# Patient Record
Sex: MALE | Race: WHITE
[De-identification: names, ages, dates, MRNs, and addresses within clinical notes are randomized per-mention and may not be internally consistent; named-entity substitution may affect disease eponyms.]

---

## 2017-04-29 ENCOUNTER — HOSPITAL ENCOUNTER (INPATIENT)
Dept: HOSPITAL 11 - JP.ED | Age: 67
LOS: 5 days | Discharge: HOME | DRG: 380 | End: 2017-05-04
Attending: INTERNAL MEDICINE | Admitting: INTERNAL MEDICINE
Payer: MEDICARE

## 2017-04-29 DIAGNOSIS — K21.9: ICD-10-CM

## 2017-04-29 DIAGNOSIS — E78.00: ICD-10-CM

## 2017-04-29 DIAGNOSIS — I10: ICD-10-CM

## 2017-04-29 DIAGNOSIS — K22.70: Primary | ICD-10-CM

## 2017-04-29 DIAGNOSIS — J18.1: ICD-10-CM

## 2017-04-29 DIAGNOSIS — R06.02: ICD-10-CM

## 2017-04-29 PROCEDURE — C9113 INJ PANTOPRAZOLE SODIUM, VIA: HCPCS

## 2017-04-29 PROCEDURE — 36415 COLL VENOUS BLD VENIPUNCTURE: CPT

## 2017-04-29 PROCEDURE — 99284 EMERGENCY DEPT VISIT MOD MDM: CPT

## 2017-04-29 PROCEDURE — 93005 ELECTROCARDIOGRAM TRACING: CPT

## 2017-04-29 PROCEDURE — 96361 HYDRATE IV INFUSION ADD-ON: CPT

## 2017-04-29 PROCEDURE — 84550 ASSAY OF BLOOD/URIC ACID: CPT

## 2017-04-29 PROCEDURE — 86140 C-REACTIVE PROTEIN: CPT

## 2017-04-29 PROCEDURE — 99285 EMERGENCY DEPT VISIT HI MDM: CPT

## 2017-04-29 PROCEDURE — S0077 INJECTION, CLINDAMYCIN PHOSP: HCPCS

## 2017-04-29 PROCEDURE — 93010 ELECTROCARDIOGRAM REPORT: CPT

## 2017-04-29 PROCEDURE — 71020: CPT

## 2017-04-29 PROCEDURE — 80053 COMPREHEN METABOLIC PANEL: CPT

## 2017-04-29 PROCEDURE — 83880 ASSAY OF NATRIURETIC PEPTIDE: CPT

## 2017-04-29 PROCEDURE — 81001 URINALYSIS AUTO W/SCOPE: CPT

## 2017-04-29 PROCEDURE — 85025 COMPLETE CBC W/AUTO DIFF WBC: CPT

## 2017-04-29 RX ADMIN — GUAIFENESIN AND CODEINE PHOSPHATE PRN ML: 10; 100 LIQUID ORAL at 23:55

## 2017-04-29 RX ADMIN — LEVOFLOXACIN SCH MLS/HR: 750 INJECTION, SOLUTION INTRAVENOUS at 17:41

## 2017-04-29 RX ADMIN — GUAIFENESIN AND CODEINE PHOSPHATE PRN ML: 10; 100 LIQUID ORAL at 19:45

## 2017-04-29 RX ADMIN — SUCRALFATE SCH GM: 1 SUSPENSION ORAL at 19:45

## 2017-04-29 NOTE — PCM.HP
H&P History of Present Illness





- General


Date of Service: 04/29/17


Admit Problem/Dx: 


 Admission Diagnosis/Problem





Admission Diagnosis/Problem      Pneumonia








Source of Information: Patient, Family, Provider


History Limitations: Reports: No limitations





- History of Present Illness


Initial Comments - Free Text/Narative: 





CC: I can't stop coughing 





HPI: Willis presents to the emergency room today with nearly 3 weeks of 

progressive cough with increasing sputum production and severe heartburn.  He 

developed both a significant increase in his heartburn symptoms and a cough at 

about the same time.  His cough has become more frequent to the point that he 

is coughing fairly steadily over the past 24 hours.  He has been unable to 

sleep because she's coughing so much.  Cough is productive for yellow sputum on 

a semi-regular basis.  He has some pleuritic pain on the left side of his 

chest.  He has had subjective fevers and chills for more than a week and last 

night had an episode of significant diaphoresis while he was sleeping.  He has 

not measured any temperatures at home.  He also reports severe esophageal 

reflux symptoms with mild to moderate achy pain in his throat.  Pain radiates 

throughout most of his neck.  When his heartburn symptoms flareup that makes 

the pain worse.  Not eating seems to help the pain lessened and baking soda and 

water mixed help the pain some.  He has had similar discomfort but never this 

severe.  He has not had any trouble with vomiting but has not had an appetite 

for the past 2 weeks.  He's had very little to eat or drink other than a she 

reports some very mild periumbilical abdominal pain.  He has had looser stools 

than usual.  No change in bladder habits.  His energy is significantly 

decreased in his been sleeping more than usual.





Workup in the emergency room revealed significant leukocytosis, very elevated C-

reactive protein and probable left lower lobe pneumonia.  Given his significant 

coughing, pneumonia and severe reflux symptoms he will be admitted to the 

hospital for further management.


  ** esophageal


Pain Score (Numeric/FACES): 3





- Related Data


Allergies/Adverse Reactions: 


 Allergies











Allergy/AdvReac Type Severity Reaction Status Date / Time


 


No Known Allergies Allergy   Verified 04/29/17 14:30











Home Medications: 


 Home Meds





Aspirin [Low Dose Aspirin EC] 81 mg PO DAILY 09/02/14 [History]


Omeprazole Magnesium [Prilosec Otc] 40 mg PO DAILY 04/27/16 [History]











Past Medical History


HEENT History: Reports: Cataract, Impaired vision


Cardiovascular History: Reports: High cholesterol, Hypertension, Other (see 

below)


Other Cardiovascular History: "[facility]"


Gastrointestinal History: Reports: Diverticulosis, GERD


Musculoskeletal History: Reports: Other (see below)


Other Musculoskeletal History: chronic right shoulder pain from playing 

softball when younger





- Infectious Disease History


Infectious Disease History: Reports: Chicken pox





- Past Surgical History


HEENT Surgical History: Reports: Cataract surgery, Oral surgery, Other (see 

below)


Other HEENT Surgeries/Procedures: both eyes.  Some [facility] problems - [facility], has an appt in [place] on 12/6/2017


GI Surgical History: Reports: Cholecystectomy


Musculoskeletal Surgical History: Reports: Other (see below)


Other Musculoskeletal Surgeries/Procedures:: r ankle and knee surgery





Social & Family History





- Family History


GI: Reports: Cirrhosis (4 brothers), GERD (mother)





- Tobacco Use


Smoking Status *Q: Never Smoker


Years of Tobacco use: 3


Second Hand Smoke Exposure: No





- Caffeine Use


Caffeine Use: Reports: None





- Alcohol Use


Days Per Week of Alcohol Use: 0





- Recreational Drug Use


Recreational Drug Use: No





H&P Review of Systems





- Review of Systems:


Review Of Systems: See Below


Free Text/Narrative: 





A complete 12 point review of systems was obtained.  Pertinent positives and 

negatives are noted in the history of present illness.  All other systems were 

reviewed and were negative except as noted.





Exam





- Exam


Exam: See Below





- Vital Signs


Vital Signs: 


 Last Vital Signs











Temp  37.9 C   04/29/17 14:35


 


Pulse  69   04/29/17 16:20


 


Resp  18   04/29/17 16:20


 


BP  137/62   04/29/17 16:20


 


Pulse Ox  92 L  04/29/17 16:20











Weight: 87.5 kg





- Exam


Quality Assessment: No: supplemental oxygen, urinary catheter


General: alert, oriented, cooperative.  No: mild distress


HEENT: Conjunctiva clear, Mucosa moist & pink.  No: Scleral icterus


Neck: supple, trachea midline.  No: lymphadenopathy, thyromegaly


Lungs: Normal respiratory effort, Rales (left lung base).  No: Wheezing


Cardiovascular: regular rate, regular rhythm.  No: systolic murmur


Abdomen: normal bowel sounds, soft, tenderness (mild periumbilical and 

epigastric ).  No: distention, mass


Back Exam: normal inspection, full range of motion


Extremities: normal inspection, normal pulses.  No: cyanosis, edema


Peripheral Pulses: 1+: dorsalis pedis (L), dorsalis pedis (R)


Skin: warm, dry, intact.  No: rash


Neuro Extensive - Mental Status: alert, oriented x3, nl response to commands


Neuro Extensive - Motor, Sensory, Reflexes: CN II-XII intact.  No: dysarthria, 

abnormal motor, tremor


Psychiatric: alert, normal affect





- Patient Data


Lab Results last 24 hrs: 


 Laboratory Results - last 24 hr











  04/29/17 04/29/17 04/29/17 Range/Units





  14:59 15:13 15:13 


 


WBC  19.4 H    (4.5-11.0)  K/uL


 


RBC  4.25 L    (4.30-5.90)  M/uL


 


Hgb  14.1  D    (12.0-15.0)  g/dL


 


Hct  39.9 L    (40.0-54.0)  %


 


MCV  94    (80-98)  fL


 


MCH  33 H    (27-31)  pg


 


MCHC  35    (32-36)  %


 


Plt Count  233    (150-400)  K/uL


 


Neut % (Auto)  79 H    (36-66)  %


 


Lymph % (Auto)  12 L    (24-44)  %


 


Mono % (Auto)  8 H    (2-6)  %


 


Eos % (Auto)  1 L    (2-4)  %


 


Baso % (Auto)  1    (0-1)  %


 


Sodium    137 L  (140-148)  mmol/L


 


Potassium    3.6  (3.6-5.2)  mmol/L


 


Chloride    103  (100-108)  mmol/L


 


Carbon Dioxide    27  (21-32)  mmol/L


 


Anion Gap    10.6  (5.0-14.0)  mmol/L


 


BUN    12  (7-18)  mg/dL


 


Creatinine    1.4 H  (0.8-1.3)  mg/dL


 


Est Cr Clr Drug Dosing    52.14  mL/min


 


Estimated GFR (MDRD)    50 L  (>60)  


 


Glucose    99  ()  mg/dL


 


Uric Acid    6.3  (3.5-7.2)  mg/dL


 


Calcium    7.8 L  (8.5-10.1)  mg/dL


 


Total Bilirubin    1.0  (0.2-1.0)  mg/dL


 


AST    36  (15-37)  U/L


 


ALT    63  (12-78)  U/L


 


Alkaline Phosphatase    101  ()  U/L


 


C-Reactive Protein   9.10 H   (0.0-0.3)  mg/dL


 


Pro-B-Natriuretic Pept    1056 H  (5-125)  pg/mL


 


Total Protein    6.2 L  (6.4-8.2)  g/dL


 


Albumin    2.6 L  (3.4-5.0)  g/dL


 


Globulin    3.6 H  (2.3-3.5)  g/dL


 


Albumin/Globulin Ratio    0.7 L  (1.2-2.2)  


 


Urine Color     


 


Urine Appearance     


 


Urine pH     (4.5-8.0)  


 


Ur Specific Gravity     (1.008-1.030)  


 


Urine Protein     (NEGATIVE)  mg/dL


 


Urine Glucose (UA)     (NEGATIVE)  mg/dL


 


Urine Ketones     (NEGATIVE)  mg/dL


 


Urine Occult Blood     (NEGATIVE)  


 


Urine Nitrite     (NEGAITVE)  


 


Urine Bilirubin     (NEGATIVE)  


 


Urine Urobilinogen     (NORMAL)  mg/dL


 


Ur Leukocyte Esterase     (NEGATIVE)  


 


Urine RBC     (0-5)  


 


Urine WBC     (0-5)  


 


Ur Epithelial Cells     


 


Amorphous Sediment     


 


Urine Bacteria     


 


Urine Mucus     














  04/29/17 Range/Units





  15:46 


 


WBC   (4.5-11.0)  K/uL


 


RBC   (4.30-5.90)  M/uL


 


Hgb   (12.0-15.0)  g/dL


 


Hct   (40.0-54.0)  %


 


MCV   (80-98)  fL


 


MCH   (27-31)  pg


 


MCHC   (32-36)  %


 


Plt Count   (150-400)  K/uL


 


Neut % (Auto)   (36-66)  %


 


Lymph % (Auto)   (24-44)  %


 


Mono % (Auto)   (2-6)  %


 


Eos % (Auto)   (2-4)  %


 


Baso % (Auto)   (0-1)  %


 


Sodium   (140-148)  mmol/L


 


Potassium   (3.6-5.2)  mmol/L


 


Chloride   (100-108)  mmol/L


 


Carbon Dioxide   (21-32)  mmol/L


 


Anion Gap   (5.0-14.0)  mmol/L


 


BUN   (7-18)  mg/dL


 


Creatinine   (0.8-1.3)  mg/dL


 


Est Cr Clr Drug Dosing   mL/min


 


Estimated GFR (MDRD)   (>60)  


 


Glucose   ()  mg/dL


 


Uric Acid   (3.5-7.2)  mg/dL


 


Calcium   (8.5-10.1)  mg/dL


 


Total Bilirubin   (0.2-1.0)  mg/dL


 


AST   (15-37)  U/L


 


ALT   (12-78)  U/L


 


Alkaline Phosphatase   ()  U/L


 


C-Reactive Protein   (0.0-0.3)  mg/dL


 


Pro-B-Natriuretic Pept   (5-125)  pg/mL


 


Total Protein   (6.4-8.2)  g/dL


 


Albumin   (3.4-5.0)  g/dL


 


Globulin   (2.3-3.5)  g/dL


 


Albumin/Globulin Ratio   (1.2-2.2)  


 


Urine Color  Yellow  


 


Urine Appearance  Clear  


 


Urine pH  7.0  (4.5-8.0)  


 


Ur Specific Gravity  1.010  (1.008-1.030)  


 


Urine Protein  Negative  (NEGATIVE)  mg/dL


 


Urine Glucose (UA)  Normal  (NEGATIVE)  mg/dL


 


Urine Ketones  Negative  (NEGATIVE)  mg/dL


 


Urine Occult Blood  Negative  (NEGATIVE)  


 


Urine Nitrite  Negative  (NEGAITVE)  


 


Urine Bilirubin  Negative  (NEGATIVE)  


 


Urine Urobilinogen  1  (NORMAL)  mg/dL


 


Ur Leukocyte Esterase  Negative  (NEGATIVE)  


 


Urine RBC  0-5  (0-5)  


 


Urine WBC  0-5  (0-5)  


 


Ur Epithelial Cells  Rare  


 


Amorphous Sediment  Not seen  


 


Urine Bacteria  Rare  


 


Urine Mucus  Not seen  











Result Diagrams: 


 04/29/17 14:59





 04/29/17 15:13


Imaging Impressions last 24 hrs: 





CXR - images personally reviewed and compared to previous CXR's - there is a 

possible subtle left lung infiltrate near the left heart border. No mass or 

chf. no bony abnormalities. 





EKG INTERPRETATION


EKG Date: 04/29/17


Rhythm: NSR


Rate (beats/min): 68


Axis: LAD-left axis deviation (LAFB)


P-wave: present


QRS: normal


ST-T: normal


QT: normal


EKG Interpretation Comments: 





Poor R wave progression 





*Q Meaningful Use (ADM)





- VTE *Q


VTE Criteria *Q: 








- VTE Risk Assess *Q


Each Risk Factor Represents 1 Point: Minor Surgery Planned


Total Score 1 Point Risk Factors: 1


Each Risk Factor Represents 2 Points: Age 60 - 74 Years


Total Score 2 Point Risk Factors: 2





- Stroke *Q


Stroke Criteria *Q: 








- AMI *Q


AMI Criteria *Q: 








- Problem List


(1) Pneumonia


SNOMED Code(s): 417069725


   ICD Code: J18.9 - PNEUMONIA, UNSPECIFIED ORGANISM   Status: Acute   Current 

Visit: Yes   


Qualifiers: 


   Pneumonia type: due to unspecified organism   Laterality: left   Lung 

location: lower lobe of lung   Qualified Code(s): J18.1 - Lobar pneumonia, 

unspecified organism   





(2) GERD (gastroesophageal reflux disease)


SNOMED Code(s): 820912445


   ICD Code: K21.9 - GASTRO-ESOPHAGEAL REFLUX DISEASE WITHOUT ESOPHAGITIS   

Status: Acute   Current Visit: Yes   


Qualifiers: 


   Esophagitis presence: esophagitis presence not specified   Qualified Code(s)

: K21.9 - Gastro-esophageal reflux disease without esophagitis   


Problem List Initiated/Reviewed/Updated: Yes


Orders Last 24hrs: 


 Active Orders 24 hr











 Category Date Time Status


 


 Patient Status Manage Transfer [TRANSFER] Routine ADT  04/29/17 17:23 Ordered


 


 EKG Documentation Completion [RC] ASDIRECTED Care  04/29/17 16:29 Active


 


 RT Aerosol Therapy [RC] ASDIRECTED Care  04/29/17 15:41 Active


 


 Chest 2V [CR] Stat Exams  04/29/17 15:32 Taken


 


 CULTURE RESPIRATORY + SMEAR [RM] Routine Lab  04/29/17 17:23 Uncollected


 


 Benzonatate [Tessalon Perles] Med  04/29/17 17:22 Active





 100 mg PO TID PRN   


 


 Levofloxacin/Dextrose 5%-Water [Levaquin in D5W 750 MG/ Med  04/29/17 17:30 

Active





 150 ML] 750 mg   





 Premix Bag 1 bag   





 IV Q24H   


 


 Sodium Chloride 0.9% [Normal Saline] 1,000 ml Med  04/29/17 15:45 Active





 IV ASDIRECTED   


 


 Sodium Chloride 0.9% [Normal Saline] 1,000 ml Med  04/29/17 17:30 Active





 IV ASDIRECTED   


 


 Resuscitation Status Routine Resus Stat  04/29/17 17:24 Ordered


 


 EKG 12 Lead [EK] Routine Ther  04/29/17 16:29 Ordered








 Medication Orders





Benzonatate (Tessalon Perles)  100 mg PO TID PRN


   PRN Reason: Cough


Sodium Chloride (Normal Saline)  1,000 mls @ 999 mls/hr IV ASDIRECTED CITLALY


   Last Admin: 04/29/17 15:58  Dose: 999 mls/hr


Levofloxacin/Dextrose 750 mg/ (Premix)  150 mls @ 100 mls/hr IV Q24H CITLALY


Sodium Chloride (Normal Saline)  1,000 mls @ 125 mls/hr IV ASDIRECTED CITLALY








Assessment/Plan Comment:: 





Assessment and plan - 





Left lower lobe pneumonia - chest x-ray did not all that impressive but 

significant crackles on the side with examination and significant cough as 

well.  He has an elevated white count and C-reactive protein.  No significant 

smoking history.  He is not currently hypoxic but has very impressive coughing 

symptoms.  No evidence for sepsis.


-Levofloxacin


-Sputum culture


-Oxygen if needed


-Nebulizers as needed


-Cough suppressant


-Blood cultures if he spikes a fever





Severe GERD - history of reflux but symptoms are usually able to be controlled 

with omeprazole.  Severe symptoms for the past 2-3 weeks.  Unable to keep much 

of anything because of his symptoms.  Unclear if the pneumonia is worsening his 

baseline symptoms or if the GERD led to aspiration.  


-Proton pump inhibitor


-Sucralfate


-Maalox as needed 


-EGD in the morning with Dr. Cantu





Maintenance issues - 


- DVT prophylaxis - SCDs and ambulation


- GI prophylaxis - PPI


- Nutrition - regular diet today, n.p.o. after midnight


- Blanchard catheter - not indicated 





CODE STATUS - full code 





Admission justification - This patient will be admitted for inpatient services 

and is medically appropriate meeting medical necessity for inpatient admission 

as outlined in my documentation.  I reasonably expect the patient will require 

inpatient services that span a period time of 2 midnights or more. I reasonably 

expect this patient to be discharged or transferred within 96 hours [facility]





Disposition - anticipate discharge home after the hospital stay 





Primary care physician - Dr. Anatoly Deras M.D.

## 2017-04-29 NOTE — EDM.PDOC
ED HPI GENERAL MEDICAL PROBLEM





- General


Chief Complaint: General


Stated Complaint: ILLNESS


Time Seen by Provider: 04/29/17 15:34


Source of Information: Reports: Patient, Family


History Limitations: Reports: No limitations





- History of Present Illness


INITIAL COMMENTS - FREE TEXT/NARRATIVE: 





Pt arrived with a cough and fever  He has some pain when he takes a deep 

breath.  He is coughing some yellow sputum. 


Onset: gradual


Duration: Day(s):, Other (pt has been coughing for 1 week. )


Location: Reports: chest


Associated Symptoms: Reports: cough, diaphoresis, fever/chills, loss of appetite

, malaise, shortness of breath


  ** esophageal


Pain Score (Numeric/FACES): 3





- Related Data


 Allergies











Allergy/AdvReac Type Severity Reaction Status Date / Time


 


No Known Allergies Allergy   Verified 04/29/17 14:30











Home Meds: 


 Home Meds





Aspirin [Low Dose Aspirin EC] 81 mg PO DAILY 09/02/14 [History]


Omeprazole Magnesium [Prilosec Otc] 40 mg PO DAILY PRN 04/27/16 [History]











Past Medical History


HEENT History: Reports: Cataract, Impaired vision


Cardiovascular History: Reports: High cholesterol, Hypertension, Other (see 

below)


Other Cardiovascular History: "[facility]"


Gastrointestinal History: Reports: Diverticulosis, GERD


Musculoskeletal History: Reports: Other (see below)


Other Musculoskeletal History: chronic right shoulder pain from playing 

softball when younger





- Infectious Disease History


Infectious Disease History: Reports: Chicken pox





- Past Surgical History


HEENT Surgical History: Reports: Cataract surgery, Oral surgery, Other (see 

below)


Other HEENT Surgeries/Procedures: both eyes.  Some [facility] problems - [facility], has an appt in [place] on 12/6/2017


GI Surgical History: Reports: Cholecystectomy


Musculoskeletal Surgical History: Reports: Other (see below)


Other Musculoskeletal Surgeries/Procedures:: r ankle and knee surgery





Social & Family History





- Tobacco Use


Smoking Status *Q: Never Smoker


Years of Tobacco use: 3


Second Hand Smoke Exposure: No





- Caffeine Use


Caffeine Use: Reports: None





- Alcohol Use


Days Per Week of Alcohol Use: 0





- Recreational Drug Use


Recreational Drug Use: No





ED ROS GENERAL





- Review of Systems


Review Of Systems: See Below


Constitutional: Reports: fever, chills, malaise, diaphoresis


HEENT: Reports: No symptoms


Respiratory: Reports: Shortness of Breath, Cough, Sputum


Cardiovascular: Reports: No symptoms


Endocrine: Reports: no symptoms


GI/Abdominal: Reports: No symptoms


: Reports: no symptoms


Musculoskeletal: Reports: no symptoms





ED EXAM, GENERAL





- Physical Exam


Exam: See Below


Free Text/Narrative:: 





Pt arrived with a continuous  coughing and raising yellow sputum.  He is 

diaphoretic. He is having  alot of acid reflux. 


Exam Limited By: No limitations


General Appearance: alert, moderate distress


Ears: normal TMs


Nose: normal inspection


Throat/Mouth: Normal inspection


Head: atraumatic


Neck: normal inspection


Respiratory/Chest: decreased breath sounds, wheezing


Cardiovascular: regular rate, rhythm


GI/Abdominal: soft, non tender


 (Male) Exam: Deferred


Rectal (Males) Exam: Deferred


Back Exam: normal inspection


Extremities: normal inspection


Neurological: alert, oriented, normal cognition


Psychiatric: normal affect





Course





- Vital Signs


Last Recorded V/S: 


 Last Vital Signs











Temp  37.9 C   04/29/17 14:35


 


Pulse  57 L  04/29/17 14:35


 


Resp  14   04/29/17 14:35


 


BP  136/73   04/29/17 14:35


 


Pulse Ox  95   04/29/17 14:35














- Orders/Labs/Meds


Orders: 


 Active Orders 24 hr











 Category Date Time Status


 


 RT Aerosol Therapy [RC] ASDIRECTED Care  04/29/17 15:41 Active


 


 Chest 2V [CR] Stat Exams  04/29/17 15:32 Taken


 


 COMPREHENSIVE METABOLIC PN,CMP [CHEM] Urgent Lab  04/29/17 15:13 Received


 


 PRO B-TYPE NATRIUR PEPT,BNPPRO [CHEM] Urgent Lab  04/29/17 15:13 Received


 


 URIC ACID [CHEM] Stat Lab  04/29/17 15:13 Received


 


 Sodium Chloride 0.9% [Normal Saline] 1,000 ml Med  04/29/17 15:45 Active





 IV ASDIRECTED   








 Medication Orders





Sodium Chloride (Normal Saline)  1,000 mls @ 999 mls/hr IV ASDIRECTED CITLALY


   Last Admin: 04/29/17 15:58  Dose: 999 mls/hr








Labs: 


 Laboratory Tests











  04/29/17 04/29/17 04/29/17 Range/Units





  14:59 15:13 15:46 


 


WBC  19.4 H    (4.5-11.0)  K/uL


 


RBC  4.25 L    (4.30-5.90)  M/uL


 


Hgb  14.1  D    (12.0-15.0)  g/dL


 


Hct  39.9 L    (40.0-54.0)  %


 


MCV  94    (80-98)  fL


 


MCH  33 H    (27-31)  pg


 


MCHC  35    (32-36)  %


 


Plt Count  233    (150-400)  K/uL


 


Neut % (Auto)  79 H    (36-66)  %


 


Lymph % (Auto)  12 L    (24-44)  %


 


Mono % (Auto)  8 H    (2-6)  %


 


Eos % (Auto)  1 L    (2-4)  %


 


Baso % (Auto)  1    (0-1)  %


 


C-Reactive Protein   9.10 H   (0.0-0.3)  mg/dL


 


Urine Color    Yellow  


 


Urine Appearance    Clear  


 


Urine pH    7.0  (4.5-8.0)  


 


Ur Specific Gravity    1.010  (1.008-1.030)  


 


Urine Protein    Negative  (NEGATIVE)  mg/dL


 


Urine Glucose (UA)    Normal  (NEGATIVE)  mg/dL


 


Urine Ketones    Negative  (NEGATIVE)  mg/dL


 


Urine Occult Blood    Negative  (NEGATIVE)  


 


Urine Nitrite    Negative  (NEGAITVE)  


 


Urine Bilirubin    Negative  (NEGATIVE)  


 


Urine Urobilinogen    1  (NORMAL)  mg/dL


 


Ur Leukocyte Esterase    Negative  (NEGATIVE)  


 


Urine RBC    0-5  (0-5)  


 


Urine WBC    0-5  (0-5)  


 


Ur Epithelial Cells    Rare  


 


Amorphous Sediment    Not seen  


 


Urine Bacteria    Rare  


 


Urine Mucus    Not seen  











Meds: 


Medications











Generic Name Dose Route Start Last Admin





  Trade Name Freq  PRN Reason Stop Dose Admin


 


Sodium Chloride  1,000 mls @ 999 mls/hr  04/29/17 15:45  04/29/17 15:58





  Normal Saline  IV   999 mls/hr





  ASDIRECTED CITLALY   Administration














Discontinued Medications














Generic Name Dose Route Start Last Admin





  Trade Name Freq  PRN Reason Stop Dose Admin


 


Albuterol  2.5 mg  04/29/17 15:41  04/29/17 15:59





  Proventil Neb Soln  NEB  04/29/17 15:42  2.5 mg





  ONETIME ONE   Administration














- Re-Assessments/Exams


Free Text/Narrative Re-Assessment/Exam: 





04/29/17 16:26


 wbc is markedly elevated  crp was greater than 9.  His chest xray does not 

show a clear heart border. 





Departure





- Departure


Time of Disposition: 16:28


Disposition: Admitted As Inpatient 65


Condition: fair


Clinical Impression: 


 Pneumonia, Dehydration, GERD (gastroesophageal reflux disease)





Forms:  ED Department Discharge


Care Plan Goals: 


 admit to Dr rubio. 





- My Orders


Last 24 Hours: 


My Active Orders





04/29/17 15:13


COMPREHENSIVE METABOLIC PN,CMP [CHEM] Urgent 


PRO B-TYPE NATRIUR PEPT,BNPPRO [CHEM] Urgent 


URIC ACID [CHEM] Stat 





04/29/17 15:32


Chest 2V [CR] Stat 





04/29/17 15:41


RT Aerosol Therapy [RC] ASDIRECTED 





04/29/17 15:45


Sodium Chloride 0.9% [Normal Saline] 1,000 ml IV ASDIRECTED 














- Assessment/Plan


Last 24 Hours: 


My Active Orders





04/29/17 15:13


COMPREHENSIVE METABOLIC PN,CMP [CHEM] Urgent 


PRO B-TYPE NATRIUR PEPT,BNPPRO [CHEM] Urgent 


URIC ACID [CHEM] Stat 





04/29/17 15:32


Chest 2V [CR] Stat 





04/29/17 15:41


RT Aerosol Therapy [RC] ASDIRECTED 





04/29/17 15:45


Sodium Chloride 0.9% [Normal Saline] 1,000 ml IV ASDIRECTED

## 2017-04-30 PROCEDURE — 0DB48ZX EXCISION OF ESOPHAGOGASTRIC JUNCTION, VIA NATURAL OR ARTIFICIAL OPENING ENDOSCOPIC, DIAGNOSTIC: ICD-10-PCS | Performed by: SURGERY

## 2017-04-30 RX ADMIN — GUAIFENESIN AND CODEINE PHOSPHATE PRN ML: 10; 100 LIQUID ORAL at 04:06

## 2017-04-30 RX ADMIN — ALBUTEROL SULFATE PRN MG: 2.5 SOLUTION RESPIRATORY (INHALATION) at 21:47

## 2017-04-30 RX ADMIN — GUAIFENESIN AND CODEINE PHOSPHATE PRN ML: 10; 100 LIQUID ORAL at 08:04

## 2017-04-30 RX ADMIN — SUCRALFATE SCH GM: 1 SUSPENSION ORAL at 13:39

## 2017-04-30 RX ADMIN — LEVOFLOXACIN SCH MLS/HR: 750 INJECTION, SOLUTION INTRAVENOUS at 17:19

## 2017-04-30 RX ADMIN — SUCRALFATE SCH: 1 SUSPENSION ORAL at 13:36

## 2017-04-30 RX ADMIN — SUCRALFATE SCH GM: 1 SUSPENSION ORAL at 17:19

## 2017-04-30 RX ADMIN — GUAIFENESIN AND CODEINE PHOSPHATE PRN ML: 10; 100 LIQUID ORAL at 19:29

## 2017-04-30 RX ADMIN — SUCRALFATE SCH GM: 1 SUSPENSION ORAL at 20:26

## 2017-04-30 NOTE — PCM.PN
- General Info


Date of Service: 04/30/17


Functional Status: Reports: pain controlled, tolerating diet, ambulating





- Review of Systems


General: Reports: Fever


Pulmonary: Reports: shortness of breath, cough


Gastrointestinal: Reports: Other (reflux)


Systems Review Comment:: 





some difficulty with cough overnight that interfered with his sleep but 

otherwise no acute events.  Still having moderate reflux symptoms this morning.

  He also had a fever this morning but no associated symptoms.  Blood pressures 

and heart rate have been stable.  He has not been hypoxic.  Tolerating 

antibiotics well.  No complaints of abdominal pain.  





- Patient Data


Vitals - most recent: 


 Last Vital Signs











Temp  38.6 C H  04/30/17 09:30


 


Pulse  67   04/30/17 07:00


 


Resp  18   04/30/17 07:00


 


BP  151/75 H  04/30/17 07:00


 


Pulse Ox  95   04/30/17 07:00











Weight - most recent: 87.5 kg


I&O - last 24 hours: 


 Intake & Output











 04/29/17 04/30/17 04/30/17





 22:59 06:59 14:59


 


Intake Total 120 2332 


 


Output Total  1300 800


 


Balance 120 1032 -800











Lab Results last 24 hrs: 


 Laboratory Results - last 24 hr











  04/30/17 04/30/17 Range/Units





  05:55 05:55 


 


WBC  14.6 H   (4.5-11.0)  K/uL


 


RBC  4.37   (4.30-5.90)  M/uL


 


Hgb  14.4   (12.0-15.0)  g/dL


 


Hct  41.5   (40.0-54.0)  %


 


MCV  95   (80-98)  fL


 


MCH  33 H   (27-31)  pg


 


MCHC  35   (32-36)  %


 


Plt Count  235   (150-400)  K/uL


 


Sodium   141  (140-148)  mmol/L


 


Potassium   4.8  (3.6-5.2)  mmol/L


 


Chloride   107  (100-108)  mmol/L


 


Carbon Dioxide   29  (21-32)  mmol/L


 


Anion Gap   4.7 L  (5.0-14.0)  mmol/L


 


BUN   10  (7-18)  mg/dL


 


Creatinine   1.3  (0.8-1.3)  mg/dL


 


Est Cr Clr Drug Dosing   56.15  mL/min


 


Estimated GFR (MDRD)   55 L  (>60)  


 


Glucose   101  ()  mg/dL


 


Calcium   8.0 L  (8.5-10.1)  mg/dL











Med Orders - Current: 


 Current Medications





Acetaminophen (Tylenol)  650 mg PO Q4H PRN


   PRN Reason: Pain (Mild 1-3)/fever


   Last Admin: 04/29/17 19:45 Dose:  650 mg


Al Hydroxide/Mg Hydroxide (Mag-Al Plus)  30 ml PO Q4H PRN


   PRN Reason: Heartburn


Albuterol (Proventil Neb Soln)  2.5 mg NEB Q4H PRN


   PRN Reason: Shortness Of Breath/wheezing


Benzonatate (Tessalon Perles)  100 mg PO TID PRN


   PRN Reason: Cough


   Last Admin: 04/29/17 23:54 Dose:  100 mg


Guaifenesin/Codeine Phosphate (Robitussin Ac)  10 ml PO Q4H PRN


   PRN Reason: Cough


   Last Admin: 04/30/17 08:04 Dose:  10 ml


Levofloxacin/Dextrose 750 mg/ (Premix)  150 mls @ 100 mls/hr IV Q24H [facility]


   Last Admin: 04/29/17 17:41 Dose:  100 mls/hr


Sodium Chloride (Normal Saline)  1,000 mls @ 125 mls/hr IV ASDIRECTED [facility]


   Last Admin: 04/30/17 02:48 Dose:  125 mls/hr


Ondansetron HCl (Zofran Odt)  4 mg PO Q6H PRN


   PRN Reason: Nausea able to take PO


Ondansetron HCl (Zofran)  4 mg IV Q6H PRN


   PRN Reason: Nausea/Vomiting


Pantoprazole Sodium (Protonix***)  40 mg PO BIDAC [facility]


Sucralfate (Carafate)  1 gm PO QIDACANDBED [facility]


   Last Admin: 04/29/17 19:45 Dose:  1 gm





Discontinued Medications





Acetaminophen (Tylenol)  650 mg RECTAL NOW ONE


   Stop: 04/30/17 09:25


   Last Admin: 04/30/17 09:36 Dose:  650 mg


Albuterol (Proventil Neb Soln)  2.5 mg NEB ONETIME ONE


   Stop: 04/29/17 15:42


   Last Admin: 04/29/17 15:59 Dose:  2.5 mg


Fentanyl (Sublimaze) Confirm Administered Dose 100 mcg .ROUTE .STK-MED ONE


   Stop: 04/30/17 08:15


Sodium Chloride (Normal Saline)  1,000 mls @ 999 mls/hr IV ASDIRECTED [facility]


   Last Admin: 04/29/17 15:58 Dose:  999 mls/hr


Midazolam HCl (Versed 1 Mg/Ml) Confirm Administered Dose 2 mg .ROUTE .STK-MED 

ONE


   Stop: 04/30/17 08:15


Pantoprazole Sodium (Protonix Iv***)  40 mg IV ONETIME ONE


   Stop: 04/29/17 18:40


   Last Admin: 04/29/17 19:45 Dose:  40 mg


Propofol (Diprivan  20 Ml) Confirm Administered Dose 200 mg .ROUTE .STK-MED ONE


   Stop: 04/30/17 08:15











- Exam


Quality Assessment: No: supplemental oxygen


General: alert, oriented, cooperative, no acute distress


Neck: supple


Lungs: Clear to auscultation, Normal respiratory effort, Decreased breath 

sounds (mild left lung base).  No: Wheezing


Cardiovascular: Regular Rate, Regular Rhythm, No Murmurs


Abdomen: soft, no distension


Extremities: no edema, no cyanosis


Skin: warm, dry


Psy/Mental Status: alert, normal affect





- Problem List & Annotations


(1) Pneumonia


SNOMED Code(s): 881769032


   Code(s): J18.9 - PNEUMONIA, UNSPECIFIED ORGANISM   Status: Acute   Current 

Visit: Yes   


Qualifiers: 


   Pneumonia type: due to unspecified organism   Laterality: left   Lung 

location: lower lobe of lung   Qualified Code(s): J18.1 - Lobar pneumonia, 

unspecified organism   





(2) GERD (gastroesophageal reflux disease)


SNOMED Code(s): 549095950


   Code(s): K21.9 - GASTRO-ESOPHAGEAL REFLUX DISEASE WITHOUT ESOPHAGITIS   

Status: Acute   Current Visit: Yes   


Qualifiers: 


   Esophagitis presence: esophagitis presence not specified   Qualified Code(s)

: K21.9 - Gastro-esophageal reflux disease without esophagitis   





- Problem List Review


Problem List Initiated/Reviewed/Updated: Yes





- My Orders


Last 24 Hours: 


My Active Orders





04/29/17 17:24


Resuscitation Status Routine 





04/29/17 18:39


Patient Status [ADT] Routine 


Intake and Output [RC] QSHIFT 


Notify Provider Consults [RC] ASDIRECTED 


Notify Provider Vital Signs [RC] ASDIRECTED 


Oxygen Therapy [RC] PRN 


RT Aerosol Therapy [RC] ASDIRECTED 


Up ad Bre [RC] ASDIRECTED 


Vital Signs [RC] Q4H 


Consult to Physician [CONS] Routine 


Acetaminophen [Tylenol]   650 mg PO Q4H PRN 


Albuterol [Proventil Neb Soln]   2.5 mg NEB Q4H PRN 


Alum Hydrox/Mag Hydrox/Simeth [Mag-Al Plus]   30 ml PO Q4H PRN 


Codeine/guaiFENesin [Robitussin AC]   10 ml PO Q4H PRN 


Ondansetron [Zofran ODT]   4 mg PO Q6H PRN 


Ondansetron [Zofran]   4 mg IV Q6H PRN 


Sequential Compression Device [OM.PC] Per Unit Routine 





04/29/17 20:00


Sucralfate [Carafate]   1 gm PO QIDACANDBED 





04/29/17 Dinner


Regular Diet [DIET] 





04/30/17 07:30


Pantoprazole [ProTONIX***]   40 mg PO BIDAC 





04/30/17 09:25


Blood Culture x2 Reflex Set [OM.PC] Urgent 





04/30/17 09:30


CULTURE BLOOD [BC] Urgent 





04/30/17 09:40


CULTURE BLOOD [BC] Urgent 





05/01/17 05:00


BASIC METABOLIC PANEL,BMP [CHEM] Timed 


CBC W/O DIFF,HEMOGRAM [HEME] Timed (1) 














- Plan


Plan:: 





Assessment and plan - 





Left lower lobe pneumonia - chest x-ray did not all that impressive but 

significant crackles on the side with examination and significant cough as 

well.  fever this morning but no hypoxia.  Tolerating antibiotics.  Exam seems 

to be improving.


-Levofloxacin


-Sputum culture


-Oxygen if needed


-Nebulizers as needed


-Cough suppressant


-Blood cultures if he spikes a fever





Severe GERD - history of reflux but symptoms are usually able to be controlled 

with omeprazole.  EGD revealed large erosion.  Symptoms have improved since the 

procedure.


-twice daily Proton pump inhibitor (3 months) 


-Sucralfate x2-4 weeks


-Maalox as needed 





Maintenance issues - 


- DVT prophylaxis - SCDs and ambulation


- GI prophylaxis - PPI


- Nutrition - restart diet after the EGD


- Blanchard catheter - not indicated 





Disposition - anticipate discharge home after the hospital stay, possibly 

tomorrow if he is afebrile and symptoms continue to improve





Carl Deras M.D. Dear Nidhi Pascal MD,         Bradley Aragon underwent successful cataract surgery on 3/8/2017 in his right eye. The surgery was performed at [facility].         He tolerated the procedure well.  I will be monitoring the healing over the next 4 weeks and prescribing glasses if necessary in 4-6 weeks.          Thank you for allowing me to participate in the care of this nice patient.  Please don't hesitate to contact me with any questions.      Kind Regards,    Santosh Haider MD  [facility]  [address].

## 2017-04-30 NOTE — PCM.SN
- Free Text/Narrative


Note: 


[facility]; concerns of fever


requests medication. patient denies any pain or shortness of breath


a; pnuemonia with fever


p; order Tylenol 1000mg po every 4hr prn fever or pain, Motrin 800mg po every 8 

hr prn pain or fever


    continue present plan of care

## 2017-05-01 RX ADMIN — SUCRALFATE SCH GM: 1 SUSPENSION ORAL at 19:44

## 2017-05-01 RX ADMIN — SUCRALFATE SCH GM: 1 SUSPENSION ORAL at 07:26

## 2017-05-01 RX ADMIN — SUCRALFATE SCH GM: 1 SUSPENSION ORAL at 11:22

## 2017-05-01 RX ADMIN — LEVOFLOXACIN SCH MLS/HR: 750 INJECTION, SOLUTION INTRAVENOUS at 17:34

## 2017-05-01 RX ADMIN — SUCRALFATE SCH GM: 1 SUSPENSION ORAL at 16:34

## 2017-05-01 RX ADMIN — ALBUTEROL SULFATE PRN MG: 2.5 SOLUTION RESPIRATORY (INHALATION) at 14:41

## 2017-05-01 RX ADMIN — ACETYLCYSTEINE SCH MG: 200 INHALANT RESPIRATORY (INHALATION) at 20:21

## 2017-05-01 NOTE — PCM.PN
- General Info


Date of Service: 05/01/17


Functional Status: Reports: ambulating, urinating





- Review of Systems


General: Reports: Fever, Weakness, Chills


Pulmonary: Reports: shortness of breath, cough.  Denies: pleuritic chest pain, 

sputum, hemoptysis, wheezing


Cardiovascular: Reports: Dyspnea on Exertion.  Denies: Chest Pain, Palpitations

, Orthopnea, PND, Edema, Lightheadedness


Gastrointestinal: Reports: No symptoms


Systems Review Comment:: 





This patient is a 67-year-old gentleman admitted with pneumonia and severe 

esophageal reflux.  Continues to experience temperature elevations, white blood 

cell count is improved.  Energy level is better but he continues to have fairly 

poor appetite, intake of liquids has been adequate.





- Patient Data


Vitals - most recent: 


 Last Vital Signs











Temp  101.0 F H  05/01/17 11:23


 


Pulse  80   05/01/17 11:23


 


Resp  16   05/01/17 11:23


 


BP  176/84 H  05/01/17 11:23


 


Pulse Ox  95   05/01/17 11:23











Weight - most recent: 192 lb 14.472 oz


I&O - last 24 hours: 


 Intake & Output











 04/30/17 05/01/17 05/01/17





 22:59 06:59 14:59


 


Intake Total 2000 1835 240


 


Output Total 600 600 300


 


Balance 1400 1235 -60











Lab Results last 24 hrs: 


 Laboratory Results - last 24 hr











  05/01/17 05/01/17 Range/Units





  05:51 05:51 


 


WBC  12.8 H   (4.5-11.0)  K/uL


 


RBC  4.47   (4.30-5.90)  M/uL


 


Hgb  14.7   (12.0-15.0)  g/dL


 


Hct  42.2   (40.0-54.0)  %


 


MCV  94   (80-98)  fL


 


MCH  33 H   (27-31)  pg


 


MCHC  35   (32-36)  %


 


Plt Count  254   (150-400)  K/uL


 


Sodium   139 L  (140-148)  mmol/L


 


Potassium   3.9  (3.6-5.2)  mmol/L


 


Chloride   105  (100-108)  mmol/L


 


Carbon Dioxide   28  (21-32)  mmol/L


 


Anion Gap   9.9  (5.0-14.0)  mmol/L


 


BUN   8  (7-18)  mg/dL


 


Creatinine   1.1  (0.8-1.3)  mg/dL


 


Est Cr Clr Drug Dosing   66.36  mL/min


 


Estimated GFR (MDRD)   > 60  (>60)  


 


Glucose   95  ()  mg/dL


 


Calcium   7.9 L  (8.5-10.1)  mg/dL











Will Results last 24 hrs: 


 Microbiology











 04/30/17 09:40 Aerobic Blood Culture - Preliminary





 Blood - Arm, Left    NO GROWTH AFTER 1 DAY





 Anaerobic Blood Culture - Preliminary





    NO GROWTH AFTER 1 DAY


 


 04/30/17 09:30 Aerobic Blood Culture - Preliminary





 Blood - Arm, Left    NO GROWTH AFTER 1 DAY





 Anaerobic Blood Culture - Preliminary





    NO GROWTH AFTER 1 DAY











Med Orders - Current: 


 Current Medications





Acetaminophen (Tylenol Extra Strength)  1,000 mg PO Q6H PRN


   PRN Reason: Fever


   Last Admin: 05/01/17 10:50 Dose:  1,000 mg


Acetylcysteine (Mucomyst 20%)  200 mg NEB TIDRT [facility]


Al Hydroxide/Mg Hydroxide (Mag-Al Plus)  30 ml PO Q4H PRN


   PRN Reason: Heartburn


Albuterol (Proventil Neb Soln)  2.5 mg NEB Q4H PRN


   PRN Reason: Shortness Of Breath/wheezing


   Last Admin: 04/30/17 21:47 Dose:  2.5 mg


Benzonatate (Tessalon Perles)  200 mg PO TID PRN


   PRN Reason: Cough


   Last Admin: 05/01/17 11:27 Dose:  200 mg


Guaifenesin/Codeine Phosphate (Robitussin Ac)  10 ml PO Q4H PRN


   PRN Reason: Cough


   Last Admin: 04/30/17 19:29 Dose:  10 ml


Levofloxacin/Dextrose 750 mg/ (Premix)  150 mls @ 100 mls/hr IV Q24H [facility]


   Last Admin: 04/30/17 17:19 Dose:  100 mls/hr


Clindamycin Phosphate 600 mg/ (Sodium Chloride)  54 mls @ 100 mls/hr IV Q6H [facility]


Ibuprofen (Motrin)  800 mg PO Q8H PRN


   PRN Reason: Fever


   Last Admin: 05/01/17 11:22 Dose:  800 mg


Ondansetron HCl (Zofran Odt)  4 mg PO Q6H PRN


   PRN Reason: Nausea able to take PO


Ondansetron HCl (Zofran)  4 mg IV Q6H PRN


   PRN Reason: Nausea/Vomiting


Pantoprazole Sodium (Protonix***)  40 mg PO BIDAC [facility]


   Last Admin: 05/01/17 07:46 Dose:  40 mg


Sucralfate (Carafate)  1 gm PO QIDACANDBED [facility]


   Last Admin: 05/01/17 11:22 Dose:  1 gm





Discontinued Medications





Acetaminophen (Tylenol)  650 mg PO Q4H PRN


   PRN Reason: Pain (Mild 1-3)/fever


   Last Admin: 04/30/17 19:23 Dose:  650 mg


Acetaminophen (Tylenol)  650 mg RECTAL NOW ONE


   Stop: 04/30/17 09:25


   Last Admin: 04/30/17 09:36 Dose:  650 mg


Albuterol (Proventil Neb Soln)  2.5 mg NEB ONETIME ONE


   Stop: 04/29/17 15:42


   Last Admin: 04/29/17 15:59 Dose:  2.5 mg


Benzonatate (Tessalon Perles)  100 mg PO TID PRN


   PRN Reason: Cough


   Last Admin: 04/29/17 23:54 Dose:  100 mg


Fentanyl (Sublimaze) Confirm Administered Dose 100 mcg .ROUTE .STK-MED ONE


   Stop: 04/30/17 08:15


Sodium Chloride (Normal Saline)  1,000 mls @ 999 mls/hr IV ASDIRECTED [facility]


   Last Admin: 04/29/17 15:58 Dose:  999 mls/hr


Sodium Chloride (Normal Saline)  1,000 mls @ 125 mls/hr IV ASDIRECTED [facility]


   Last Admin: 05/01/17 08:51 Dose:  125 mls/hr


Midazolam HCl (Versed 1 Mg/Ml) Confirm Administered Dose 2 mg .ROUTE .STK-MED 

ONE


   Stop: 04/30/17 08:15


Pantoprazole Sodium (Protonix Iv***)  40 mg IV ONETIME ONE


   Stop: 04/29/17 18:40


   Last Admin: 04/29/17 19:45 Dose:  40 mg


Propofol (Diprivan  20 Ml) Confirm Administered Dose 200 mg .ROUTE .STK-MED ONE


   Stop: 04/30/17 08:15











- Exam


Quality Assessment: DVT prophylaxis


General: alert, oriented, cooperative, mild distress


Lungs: Clear to auscultation, Normal respiratory effort, Rales, Rhonchi


Cardiovascular: Regular Rate, Regular Rhythm, No Murmurs


Abdomen: bowel sounds present, soft, no tenderness, no distension


Extremities: no edema


Skin: warm, dry, intact





- Problem List Review


Problem List Initiated/Reviewed/Updated: Yes





- My Orders


Last 24 Hours: 


My Active Orders





05/01/17 13:48


Convert IV to Saline Lock [OM.PC] Routine 





05/01/17 13:50


RT Aerosol Therapy [RC] ASDIRECTED 


RT Acapella [RESPCARE] Routine 





05/01/17 14:00


Clindamycin Phosphate [Cleocin] 600 mg   Sodium Chloride 0.9% [Normal Saline] 

50 ml IV Q6H 





05/01/17 21:00


Acetylcysteine [Mucomyst 20%]   200 mg NEB TIDRT 





05/02/17 05:00


BASIC METABOLIC PANEL,BMP [CHEM] Timed 


CBC WITH AUTO DIFF [HEME] Timed 














- Plan


Plan:: 





Assessment and plan - 





Left lower lobe pneumonia - seems to be slowly improving, white count is better

, he has had recurrent temperature elevations.  Fever curve does seem to be 

slowly decreasing.  Question of possible aspiration given his significant 

reflux.


-Add clindamycin to current antibiotic therapy


-Levofloxacin


-Sputum culture


-Oxygen if needed


-Nebulizers as needed


-Cough suppressant


-Blood cultures if he spikes a fever





Severe GERD - history of reflux but symptoms are usually able to be controlled 

with omeprazole.  EGD revealed large erosion.  Symptoms have improved since the 

procedure.


-twice daily Proton pump inhibitor (3 months) 


-Sucralfate x2-4 weeks


-Maalox as needed 





Maintenance issues - 


- DVT prophylaxis - SCDs and ambulation


- GI prophylaxis - PPI


- Nutrition - restart diet after the EGD


- Blanchard catheter - not indicated 





Disposition - anticipate discharge home after the hospital stay, possibly 

tomorrow if he is afebrile and symptoms continue to improve

## 2017-05-01 NOTE — CR
Chest 2V

 

HISTORY:  Cough.

 

COMPARISON: 4/25/2016.

 

FINDINGS: Cardiac size and pulmonary vessels normal. Very faint density left lung base either repres
enting atelectasis or faint infiltrate. No effusions. Cardiac size stable.

## 2017-05-01 NOTE — OR
DATE OF PROCEDURE:  04/30/2017

 

PREOPERATIVE DIAGNOSIS:  Gastroesophageal reflux disease.

 

POSTOPERATIVE DIAGNOSIS:  Gastroesophageal reflux disease.

 

PROCEDURE:  Esophagogastroduodenoscopy with biopsy of gastroesophageal junction.

 

ANESTHESIA:  IV anesthesia with monitored anesthesia care.

 

INDICATION:  This 67-year-old white male has had years, he says, of reflux.  He has been

taking medication.  The head of the bed is elevated; however, he has had very severe

symptoms of reflux for the past few weeks.  He was admitted to the hospital.  He has what is

described as a minor pneumonia.  Request was made for upper endoscopy.  I counseled him for

upper endoscopy with possible biopsy, and he gave his informed consent to proceed.

 

DESCRIPTION OF PROCEDURE:  The patient was placed in the left lateral decubitus position.

IV anesthesia was administered by the Anesthesia Service.  Time-out was held.  The flexible

video Olympus upper endoscope was passed through his mouth, down the esophagus, and into the

stomach.  The scope was easily passed through the pylorus into the duodenum, reaching its

third portion.  The scope was then slowly withdrawn, examining the mucosa throughout.  The

duodenal mucosa appeared unremarkable.  The scope was brought up through the pylorus.  The

antrum appeared unremarkable.  The scope was retroflexed.  The proximal stomach appeared

unremarkable.  The scope was straightened.  We did notice some bile in the stomach.  We

aspirated this free.  The scope was then brought up to the GE junction.  This was markedly

abnormal with fingers of gastric mucosa coursing proximally up into the esophagus.  We

obtained multiple, totalling 6 biopsies of the gastroesophageal junction.  The scope was

then brought proximal through the remainder of the esophagus, which otherwise appeared

unremarkable, and it was removed.  He tolerated the procedure well.

 

 

 

 

Grabiel Cantu MD

DD:  04/30/2017 13:16:06

DT:  04/30/2017 14:32:39

Job #:  [id number]

## 2017-05-02 RX ADMIN — Medication SCH CAP: at 20:20

## 2017-05-02 RX ADMIN — ACETYLCYSTEINE SCH MG: 200 INHALANT RESPIRATORY (INHALATION) at 07:27

## 2017-05-02 RX ADMIN — ACETYLCYSTEINE SCH MG: 200 INHALANT RESPIRATORY (INHALATION) at 13:02

## 2017-05-02 RX ADMIN — ACETYLCYSTEINE SCH MG: 200 INHALANT RESPIRATORY (INHALATION) at 20:26

## 2017-05-02 RX ADMIN — SUCRALFATE SCH GM: 1 SUSPENSION ORAL at 07:24

## 2017-05-02 RX ADMIN — SUCRALFATE SCH GM: 1 SUSPENSION ORAL at 11:05

## 2017-05-02 RX ADMIN — LEVOFLOXACIN SCH MLS/HR: 750 INJECTION, SOLUTION INTRAVENOUS at 16:47

## 2017-05-02 RX ADMIN — ALBUTEROL SULFATE PRN MG: 2.5 SOLUTION RESPIRATORY (INHALATION) at 13:02

## 2017-05-02 RX ADMIN — SUCRALFATE SCH GM: 1 SUSPENSION ORAL at 16:40

## 2017-05-02 RX ADMIN — SUCRALFATE SCH GM: 1 SUSPENSION ORAL at 20:12

## 2017-05-02 RX ADMIN — ALBUTEROL SULFATE PRN MG: 2.5 SOLUTION RESPIRATORY (INHALATION) at 20:26

## 2017-05-02 RX ADMIN — ALBUTEROL SULFATE PRN MG: 2.5 SOLUTION RESPIRATORY (INHALATION) at 07:27

## 2017-05-02 NOTE — PCM.PN
- General Info


Date of Service: 05/02/17


Functional Status: Reports: ambulating, urinating





- Review of Systems


General: Reports: Fever, Weakness, Fatigue


Pulmonary: Reports: shortness of breath, cough.  Denies: pleuritic chest pain, 

sputum, hemoptysis, wheezing


Cardiovascular: Reports: Dyspnea on Exertion.  Denies: Chest Pain, Palpitations

, Orthopnea, PND, Edema


Gastrointestinal: Reports: No symptoms


Systems Review Comment:: 





This patient has been stable over the past 24 hours, he has had a mild 

recurrent temperature elevation in white blood cell count remains modestly 

elevated.  Shortness of breath and cough seems to be improved from admission 

and he has been up and ambulating in the halls.  He did not sleep well during 

the night and as a result of that feels fairly fatigued today.





- Patient Data


Vitals - most recent: 


 Last Vital Signs











Temp  97.5 F   05/02/17 11:09


 


Pulse  59 L  05/02/17 11:09


 


Resp  18   05/02/17 11:09


 


BP  138/72   05/02/17 11:09


 


Pulse Ox  94 L  05/02/17 11:09











Weight - most recent: 192 lb 14.472 oz


I&O - last 24 hours: 


 Intake & Output











 05/01/17 05/02/17 05/02/17





 22:59 06:59 14:59


 


Intake Total 450 450 900


 


Output Total 450 600 400


 


Balance 0 -150 500











Lab Results last 24 hrs: 


 Laboratory Results - last 24 hr











  05/02/17 05/02/17 Range/Units





  04:45 04:45 


 


WBC  13.1 H   (4.5-11.0)  K/uL


 


RBC  4.33   (4.30-5.90)  M/uL


 


Hgb  14.5   (12.0-15.0)  g/dL


 


Hct  40.6   (40.0-54.0)  %


 


MCV  94   (80-98)  fL


 


MCH  34 H   (27-31)  pg


 


MCHC  36   (32-36)  %


 


Plt Count  278   (150-400)  K/uL


 


Neut % (Auto)  70 H   (36-66)  %


 


Lymph % (Auto)  16 L   (24-44)  %


 


Mono % (Auto)  11 H   (2-6)  %


 


Eos % (Auto)  2   (2-4)  %


 


Baso % (Auto)  1   (0-1)  %


 


Sodium   141  (140-148)  mmol/L


 


Potassium   3.9  (3.6-5.2)  mmol/L


 


Chloride   106  (100-108)  mmol/L


 


Carbon Dioxide   29  (21-32)  mmol/L


 


Anion Gap   6.1  (5.0-14.0)  mmol/L


 


BUN   8  (7-18)  mg/dL


 


Creatinine   1.2  (0.8-1.3)  mg/dL


 


Est Cr Clr Drug Dosing   60.98  mL/min


 


Estimated GFR (MDRD)   > 60  (>60)  


 


Glucose   100  ()  mg/dL


 


Calcium   8.0 L  (8.5-10.1)  mg/dL











Will Results last 24 hrs: 


 Microbiology











 04/30/17 09:40 Aerobic Blood Culture - Preliminary





 Blood - Arm, Left    NO GROWTH AFTER 2 DAYS





 Anaerobic Blood Culture - Preliminary





    NO GROWTH AFTER 2 DAYS


 


 04/30/17 09:30 Aerobic Blood Culture - Preliminary





 Blood - Arm, Left    NO GROWTH AFTER 2 DAYS





 Anaerobic Blood Culture - Preliminary





    NO GROWTH AFTER 2 DAYS











Med Orders - Current: 


 Current Medications





Acetaminophen (Tylenol Extra Strength)  1,000 mg PO Q6H PRN


   PRN Reason: Fever


   Last Admin: 05/02/17 08:21 Dose:  1,000 mg


Acetylcysteine (Mucomyst 20%)  200 mg NEB TIDRT [facility]


   Last Admin: 05/02/17 07:27 Dose:  200 mg


Al Hydroxide/Mg Hydroxide (Mag-Al Plus)  30 ml PO Q4H PRN


   PRN Reason: Heartburn


Albuterol (Proventil Neb Soln)  2.5 mg NEB Q4H PRN


   PRN Reason: Shortness Of Breath/wheezing


   Last Admin: 05/02/17 07:27 Dose:  2.5 mg


Benzonatate (Tessalon Perles)  200 mg PO TID PRN


   PRN Reason: Cough


   Last Admin: 05/01/17 20:18 Dose:  200 mg


Guaifenesin/Codeine Phosphate (Robitussin Ac)  10 ml PO Q4H PRN


   PRN Reason: Cough


   Last Admin: 04/30/17 19:29 Dose:  10 ml


Levofloxacin/Dextrose 750 mg/ (Premix)  150 mls @ 100 mls/hr IV Q24H [facility]


   Last Admin: 05/01/17 17:34 Dose:  100 mls/hr


Clindamycin Phosphate 600 mg/ (Sodium Chloride)  54 mls @ 100 mls/hr IV Q6H [facility]


   Last Admin: 05/02/17 07:35 Dose:  100 mls/hr


Ibuprofen (Motrin)  800 mg PO Q8H PRN


   PRN Reason: Fever


   Last Admin: 05/01/17 22:26 Dose:  800 mg


Ondansetron HCl (Zofran Odt)  4 mg PO Q6H PRN


   PRN Reason: Nausea able to take PO


   Last Admin: 05/01/17 19:44 Dose:  4 mg


Ondansetron HCl (Zofran)  4 mg IV Q6H PRN


   PRN Reason: Nausea/Vomiting


Pantoprazole Sodium (Protonix***)  40 mg PO BIDAC [facility]


   Last Admin: 05/02/17 07:24 Dose:  40 mg


Sucralfate (Carafate)  1 gm PO QIDACANDBED [facility]


   Last Admin: 05/02/17 11:05 Dose:  1 gm





Discontinued Medications





Acetaminophen (Tylenol)  650 mg PO Q4H PRN


   PRN Reason: Pain (Mild 1-3)/fever


   Last Admin: 04/30/17 19:23 Dose:  650 mg


Acetaminophen (Tylenol)  650 mg RECTAL NOW ONE


   Stop: 04/30/17 09:25


   Last Admin: 04/30/17 09:36 Dose:  650 mg


Acetylcysteine (Mucomyst 20%)  200 mg NEB ONETIME ONE


   Stop: 05/01/17 15:01


   Last Admin: 05/01/17 14:41 Dose:  200 mg


Albuterol (Proventil Neb Soln)  2.5 mg NEB ONETIME ONE


   Stop: 04/29/17 15:42


   Last Admin: 04/29/17 15:59 Dose:  2.5 mg


Benzonatate (Tessalon Perles)  100 mg PO TID PRN


   PRN Reason: Cough


   Last Admin: 04/29/17 23:54 Dose:  100 mg


Fentanyl (Sublimaze) Confirm Administered Dose 100 mcg .ROUTE .STK-MED ONE


   Stop: 04/30/17 08:15


Sodium Chloride (Normal Saline)  1,000 mls @ 999 mls/hr IV ASDIRECTED [facility]


   Last Admin: 04/29/17 15:58 Dose:  999 mls/hr


Sodium Chloride (Normal Saline)  1,000 mls @ 125 mls/hr IV ASDIRECTED [facility]


   Last Admin: 05/01/17 08:51 Dose:  125 mls/hr


Midazolam HCl (Versed 1 Mg/Ml) Confirm Administered Dose 2 mg .ROUTE .STK-MED 

ONE


   Stop: 04/30/17 08:15


Pantoprazole Sodium (Protonix Iv***)  40 mg IV ONETIME ONE


   Stop: 04/29/17 18:40


   Last Admin: 04/29/17 19:45 Dose:  40 mg


Propofol (Diprivan  20 Ml) Confirm Administered Dose 200 mg .ROUTE .STK-MED ONE


   Stop: 04/30/17 08:15











- Exam


General: alert, oriented, cooperative, no acute distress


Lungs: Normal respiratory effort, Rales, Rhonchi.  No: Decreased breath sounds, 

Crackles, Stridor


Cardiovascular: Regular Rate, Regular Rhythm, No Murmurs


Abdomen: bowel sounds present, soft, no tenderness, no distension


Extremities: no edema


Skin: warm, dry, intact





- Problem List Review


Problem List Initiated/Reviewed/Updated: Yes





- My Orders


Last 24 Hours: 


My Active Orders





05/01/17 13:48


Convert IV to Saline Lock [OM.PC] Routine 





05/01/17 13:50


RT Acapella [RESPCARE] Routine 





05/01/17 14:00


Clindamycin Phosphate [Cleocin] 600 mg   Sodium Chloride 0.9% [Normal Saline] 

50 ml IV Q6H 





05/01/17 21:00


Acetylcysteine [Mucomyst 20%]   200 mg NEB TIDRT 





05/02/17 12:01


Zolpidem [Ambien]   5 mg PO BEDTIME PRN 














- Plan


Plan:: 





Assessment and plan - 





Left lower lobe pneumonia - seems to be slowly improving, white count is better

, he has had recurrent temperature elevations.  Continues to experience cough 

and shortness of breath but significantly improved from admission.


-Continue clindamycin and levofloxacin


-Sputum culture


-Oxygen if needed


-Nebulizers as needed


-Cough suppressant


-Blood cultures if he spikes a fever





Severe GERD - history of reflux but symptoms are usually able to be controlled 

with omeprazole.  EGD revealed large erosion.  Symptoms have improved since the 

procedure.


-twice daily Proton pump inhibitor (3 months) 


-Sucralfate x2-4 weeks


-Maalox as needed 





Maintenance issues - 


- DVT prophylaxis - SCDs and ambulation


- GI prophylaxis - PPI


- Nutrition - restart diet after the EGD


- Blanchard catheter - not indicated 





Disposition - anticipate discharge home after the hospital stay, possibly 

tomorrow if he is afebrile and symptoms continue to improve

## 2017-05-03 RX ADMIN — ALBUTEROL SULFATE PRN MG: 2.5 SOLUTION RESPIRATORY (INHALATION) at 01:57

## 2017-05-03 RX ADMIN — SUCRALFATE SCH GM: 1 SUSPENSION ORAL at 19:36

## 2017-05-03 RX ADMIN — ALBUTEROL SULFATE PRN MG: 2.5 SOLUTION RESPIRATORY (INHALATION) at 12:59

## 2017-05-03 RX ADMIN — ACETYLCYSTEINE SCH MG: 200 INHALANT RESPIRATORY (INHALATION) at 07:17

## 2017-05-03 RX ADMIN — SUCRALFATE SCH GM: 1 SUSPENSION ORAL at 17:52

## 2017-05-03 RX ADMIN — ACETYLCYSTEINE SCH MG: 200 INHALANT RESPIRATORY (INHALATION) at 12:59

## 2017-05-03 RX ADMIN — ALBUTEROL SULFATE PRN MG: 2.5 SOLUTION RESPIRATORY (INHALATION) at 07:17

## 2017-05-03 RX ADMIN — SUCRALFATE SCH GM: 1 SUSPENSION ORAL at 11:30

## 2017-05-03 RX ADMIN — ACETYLCYSTEINE SCH MG: 200 INHALANT RESPIRATORY (INHALATION) at 21:52

## 2017-05-03 RX ADMIN — LEVOFLOXACIN SCH MLS/HR: 750 INJECTION, SOLUTION INTRAVENOUS at 17:44

## 2017-05-03 RX ADMIN — ALBUTEROL SULFATE PRN MG: 2.5 SOLUTION RESPIRATORY (INHALATION) at 21:52

## 2017-05-03 RX ADMIN — Medication SCH CAP: at 09:22

## 2017-05-03 RX ADMIN — Medication SCH CAP: at 21:52

## 2017-05-03 RX ADMIN — SUCRALFATE SCH GM: 1 SUSPENSION ORAL at 07:30

## 2017-05-03 NOTE — PCM.PN
- General Info


Date of Service: 05/03/17


Functional Status: Reports: tolerating diet, ambulating, urinating





- Review of Systems


General: Reports: Fever, Weakness.  Denies: Chills


Pulmonary: Reports: cough, sputum.  Denies: shortness of breath, pleuritic 

chest pain, hemoptysis, wheezing


Cardiovascular: Reports: No Symptoms


Gastrointestinal: Reports: No symptoms


Systems Review Comment:: 





This patient has felt improved over the past 24 hours with less shortness of 

breath and cough.  He is continued to have low-grade temperature elevations but 

fever curve does seem to be slowly decreasing.  Vital signs have been stable 

and he is unable to ambulate good distances in the hallways with normal oxygen 

saturations.





- Patient Data


Vitals - most recent: 


 Last Vital Signs











Temp  100.3 F   05/03/17 14:25


 


Pulse  75   05/03/17 13:00


 


Resp  16   05/03/17 13:00


 


BP  179/75 H  05/03/17 13:00


 


Pulse Ox  94 L  05/03/17 13:00











Weight - most recent: 192 lb 14.472 oz


I&O - last 24 hours: 


 Intake & Output











 05/03/17 05/03/17 05/03/17





 06:59 14:59 22:59


 


Intake Total 50 910 


 


Output Total 700 300 


 


Balance -650 610 











Will Results last 24 hrs: 


 Microbiology











 05/03/17 12:16 Clostridium difficile (PCR) - Final





 Stool / Feces    NEGATIVE CDIFF TOXIN


 


 04/30/17 09:40 Aerobic Blood Culture - Preliminary





 Blood - Arm, Left    NO GROWTH AFTER 3 DAYS





 Anaerobic Blood Culture - Preliminary





    NO GROWTH AFTER 3 DAYS


 


 04/30/17 09:30 Aerobic Blood Culture - Preliminary





 Blood - Arm, Left    NO GROWTH AFTER 3 DAYS





 Anaerobic Blood Culture - Preliminary





    NO GROWTH AFTER 3 DAYS











Med Orders - Current: 


 Current Medications





Acetaminophen (Tylenol Extra Strength)  1,000 mg PO Q6H PRN


   PRN Reason: Fever


   Last Admin: 05/03/17 02:02 Dose:  1,000 mg


Acetylcysteine (Mucomyst 20%)  200 mg NEB TIDRT [facility]


   Last Admin: 05/03/17 12:59 Dose:  200 mg


Al Hydroxide/Mg Hydroxide (Mag-Al Plus)  30 ml PO Q4H PRN


   PRN Reason: Heartburn


Albuterol (Proventil Neb Soln)  2.5 mg NEB Q4H PRN


   PRN Reason: Shortness Of Breath/wheezing


   Last Admin: 05/03/17 12:59 Dose:  2.5 mg


Benzonatate (Tessalon Perles)  200 mg PO TID PRN


   PRN Reason: Cough


   Last Admin: 05/01/17 20:18 Dose:  200 mg


Guaifenesin/Codeine Phosphate (Robitussin Ac)  10 ml PO Q4H PRN


   PRN Reason: Cough


   Last Admin: 04/30/17 19:29 Dose:  10 ml


Levofloxacin/Dextrose 750 mg/ (Premix)  150 mls @ 100 mls/hr IV Q24H [facility]


   Last Admin: 05/02/17 16:47 Dose:  100 mls/hr


Clindamycin Phosphate 600 mg/ (Sodium Chloride)  54 mls @ 100 mls/hr IV Q6H [facility]


   Last Admin: 05/03/17 14:25 Dose:  100 mls/hr


Ibuprofen (Motrin)  800 mg PO Q8H PRN


   PRN Reason: Fever


   Last Admin: 05/03/17 13:34 Dose:  800 mg


Lactobacillus Rhamnosus (Culturelle)  2 cap PO BID [facility]


   Last Admin: 05/03/17 09:22 Dose:  2 cap


Ondansetron HCl (Zofran Odt)  4 mg PO Q6H PRN


   PRN Reason: Nausea able to take PO


   Last Admin: 05/01/17 19:44 Dose:  4 mg


Ondansetron HCl (Zofran)  4 mg IV Q6H PRN


   PRN Reason: Nausea/Vomiting


Pantoprazole Sodium (Protonix***)  40 mg PO BIDAC [facility]


   Last Admin: 05/03/17 07:30 Dose:  40 mg


Sucralfate (Carafate)  1 gm PO QIDACANDBED [facility]


   Last Admin: 05/03/17 11:30 Dose:  1 gm


Zolpidem Tartrate (Ambien)  5 mg PO BEDTIME PRN


   PRN Reason: Insomnia





Discontinued Medications





Acetaminophen (Tylenol)  650 mg PO Q4H PRN


   PRN Reason: Pain (Mild 1-3)/fever


   Last Admin: 04/30/17 19:23 Dose:  650 mg


Acetaminophen (Tylenol)  650 mg RECTAL NOW ONE


   Stop: 04/30/17 09:25


   Last Admin: 04/30/17 09:36 Dose:  650 mg


Acetylcysteine (Mucomyst 20%)  200 mg NEB ONETIME ONE


   Stop: 05/01/17 15:01


   Last Admin: 05/01/17 14:41 Dose:  200 mg


Albuterol (Proventil Neb Soln)  2.5 mg NEB ONETIME ONE


   Stop: 04/29/17 15:42


   Last Admin: 04/29/17 15:59 Dose:  2.5 mg


Benzonatate (Tessalon Perles)  100 mg PO TID PRN


   PRN Reason: Cough


   Last Admin: 04/29/17 23:54 Dose:  100 mg


Fentanyl (Sublimaze) Confirm Administered Dose 100 mcg .ROUTE .STK-MED ONE


   Stop: 04/30/17 08:15


Sodium Chloride (Normal Saline)  1,000 mls @ 999 mls/hr IV ASDIRECTED [facility]


   Last Admin: 04/29/17 15:58 Dose:  999 mls/hr


Sodium Chloride (Normal Saline)  1,000 mls @ 125 mls/hr IV ASDIRECTED [facility]


   Last Admin: 05/01/17 08:51 Dose:  125 mls/hr


Midazolam HCl (Versed 1 Mg/Ml) Confirm Administered Dose 2 mg .ROUTE .STK-MED 

ONE


   Stop: 04/30/17 08:15


Pantoprazole Sodium (Protonix Iv***)  40 mg IV ONETIME ONE


   Stop: 04/29/17 18:40


   Last Admin: 04/29/17 19:45 Dose:  40 mg


Propofol (Diprivan  20 Ml) Confirm Administered Dose 200 mg .ROUTE .STK-MED ONE


   Stop: 04/30/17 08:15











- Exam


Quality Assessment: DVT prophylaxis


General: alert, oriented, cooperative, no acute distress


Lungs: Clear to auscultation, Normal respiratory effort


Cardiovascular: Regular Rate, Regular Rhythm, No Murmurs


Abdomen: bowel sounds present, soft, no tenderness, no distension


Extremities: no edema


Skin: warm, dry, intact





- Problem List Review


Problem List Initiated/Reviewed/Updated: Yes





- My Orders


Last 24 Hours: 


My Active Orders





05/02/17 21:00


Lactobacillus Rhamnosus GG [Culturelle]   2 cap PO BID 














- Plan


Plan:: 





Assessment and plan - 





Left lower lobe pneumonia - continued slow improvement, recurrent low-grade 

temperature elevations.


-Continue clindamycin and levofloxacin


-Sputum culture


-Oxygen if needed


-Nebulizers as needed


-Cough suppressant





Severe GERD - symptomatically improved with current management.  Biopsy results 

consistent with Sheikh's esophagus


-twice daily Proton pump inhibitor (3 months) 


-Sucralfate x2-4 weeks


-Maalox as needed 





Maintenance issues - 


- DVT prophylaxis - SCDs and ambulation


- GI prophylaxis - PPI


- Nutrition - restart diet after the EGD


- Blanchard catheter - not indicated 





Disposition - anticipate discharge home tomorrow

## 2017-05-03 NOTE — PCM.SN
- Free Text/Narrative


Note: 





call from [address] [place] [address] at 22:16


elevated blood pressure, denies any pain, chest pain, shortness of breath, fever

, head pain or other symptoms


a; elevated blood pressure


p; Mr. Trujillo has had intermittent elevated blood pressures since admission. 

will continue to monitor

## 2017-05-04 VITALS — DIASTOLIC BLOOD PRESSURE: 82 MMHG | SYSTOLIC BLOOD PRESSURE: 171 MMHG

## 2017-05-04 RX ADMIN — SUCRALFATE SCH GM: 1 SUSPENSION ORAL at 07:07

## 2017-05-04 RX ADMIN — SUCRALFATE SCH GM: 1 SUSPENSION ORAL at 11:01

## 2017-05-04 RX ADMIN — ALBUTEROL SULFATE PRN MG: 2.5 SOLUTION RESPIRATORY (INHALATION) at 07:20

## 2017-05-04 RX ADMIN — GUAIFENESIN AND CODEINE PHOSPHATE PRN ML: 10; 100 LIQUID ORAL at 01:45

## 2017-05-04 RX ADMIN — Medication SCH CAP: at 09:14

## 2017-05-04 RX ADMIN — ACETYLCYSTEINE SCH MG: 200 INHALANT RESPIRATORY (INHALATION) at 07:19

## 2017-05-04 NOTE — PCM.DCSUM1
**Discharge Summary





- Hospital Course


Brief History: This patient is a 67-year-old gentleman who was admitted through 

the emergency department with progressive weakness, cough, and shortness of 

breath secondary to pneumonia.





- Discharge Data


Discharge Date: 05/04/17


Discharge Disposition: Home, Self-Care 01


Condition: Fair





- Discharge Diagnosis/Problem(s)


(1) Grey's esophagus


SNOMED Code(s): 313497048


   ICD Code: K22.70 - GREY'S ESOPHAGUS WITHOUT DYSPLASIA   Status: Acute   

Current Visit: Yes   





(2) Pneumonia


SNOMED Code(s): 359559231


   ICD Code: J18.9 - PNEUMONIA, UNSPECIFIED ORGANISM   Status: Acute   Current 

Visit: Yes   


Qualifiers: 


   Pneumonia type: due to unspecified organism   Laterality: left   Lung 

location: lower lobe of lung   Qualified Code(s): J18.1 - Lobar pneumonia, 

unspecified organism   





(3) GERD (gastroesophageal reflux disease)


SNOMED Code(s): 768351457


   ICD Code: K21.9 - GASTRO-ESOPHAGEAL REFLUX DISEASE WITHOUT ESOPHAGITIS   

Status: Acute   Current Visit: Yes   


Qualifiers: 


   Esophagitis presence: esophagitis presence not specified   Qualified Code(s)

: K21.9 - Gastro-esophageal reflux disease without esophagitis   





- Patient Summary/Data


Consults: 


 Consultations





04/29/17 18:39


Consult to Physician [CONS] Routine 


   Consulting Provider: Grabiel Cantu Call Completed to Consulting Physician: Yes


   Reason for Consult: severe GERD, consider EGD


   Person Notified: BDB


   Date Notified: 04/29/17


   Special Instructions: NPO after midnight, planning EGD in am











Hospital Course: 





This patient is a 67-year-old gentleman who presented the emergency room 

because of fever, cough, weakness, and shortness of breath.  Chest x-ray showed 

evidence of a left lung infiltrate and white blood cell count was elevated 

consistent with underlying bacterial infection.  Blood cultures were obtained 

and he was admitted to the hospital on IV antibiotic therapy with levofloxacin.

  Blood cultures were obtained at the time of admission and remained negative 

throughout the hospital stay.  IV fluids were used initially for hydration.  

Patient also reported symptoms of ongoing severe reflux for the past several 

weeks prior to admission.  This did raise the possibility of possible 

aspiration and clindamycin was added to his antibiotic regimen.  Surgical 

consult was obtained with Dr. Cantu, EGD was performed which did document 

evidence of reflux in addition to Grey's esophagus.  Biopsies were obtained 

during the EGD and did come back positive for Grey's esophagus, no 

precancerous changes were noted.  Symptoms gradually improved with use of the 

antibiotics in addition to nebulizer therapy and supplemental oxygen.  By the 

time of discharge she was off of oxygen with good saturations on room air.  

White blood cell count has improved and fever had almost totally resolved.  He 

will be discharged home on oral Protonix twice daily for 2 weeks then once 

daily thereafter as well as Carafate 1 g 4 times daily.  He will be treated 

with an additional 6 days of oral antibiotic therapy with Augmentin 875 mg 

twice daily.  Activity will be as tolerated, we did review important dietary 

changes to make given his reflux and esophagitis.  He is aware that he will 

need to have regular followup upper endoscopies for monitoring of the Grey's 

esophagus.  He would like to see a gastroenterologist for further 

recommendations concerning options for management of his significant reflux, he 

is not sure if like to go for this evaluation and will discuss it further with 

Dr. Ledbetter on followup.  All of appointment will be scheduled with Dr. Ledbetter 

within one week.





- Patient Instructions


Diet: Usual Diet as Tolerated


Activity: As Tolerated


Other/Special Instructions: Please schedule followup appointment with Dr. Ledbetter within one week.  Take a probiotic twice daily for at least 2 weeks.





- Discharge Plan


Prescriptions/Med Rec: 


Amoxicillin/Potassium Clav [Augmentin 875-125 Tablet] 1 each PO BID #12 tablet


Pantoprazole [ProTONIX***] 40 mg PO BIDAC #30 tab.cr


Sucralfate [Carafate] 1 gm PO QIDACANDBED #120 cup


Home Medications: 


 Home Meds





Aspirin [Low Dose Aspirin EC] 81 mg PO DAILY 09/02/14 [History]


Amoxicillin/Potassium Clav [Augmentin 875-125 Tablet] 1 each PO BID #12 tablet 

05/04/17 [Rx]


Pantoprazole [ProTONIX***] 40 mg PO BIDAC #30 tab.cr 05/04/17 [Rx]


Sucralfate [Carafate] 1 gm PO QIDACANDBED #120 cup 05/04/17 [Rx]








Patient Handouts:  Grey Esophagus


Referrals: 


Mike Ledbetter MD [Primary Care Provider] - 





- Patient Data


Vitals - Most Recent: 


 Last Vital Signs











Temp  98.6 F   05/04/17 10:41


 


Pulse  74   05/04/17 10:41


 


Resp  18   05/04/17 10:41


 


BP  171/82 H  05/04/17 10:41


 


Pulse Ox  97   05/04/17 10:41











Weight - Most Recent: 192 lb 14.472 oz


I&O - Last 24 hours: 


 Intake & Output











 05/03/17 05/04/17 05/04/17





 22:59 06:59 14:59


 


Intake Total 955 305 


 


Output Total 300  500


 


Balance 655 305 -500











CLARISSA Results - Last 24 hrs: 


 Microbiology











 04/30/17 09:40 Aerobic Blood Culture - Preliminary





 Blood - Arm, Left    NO GROWTH AFTER 4 DAYS





 Anaerobic Blood Culture - Preliminary





    NO GROWTH AFTER 4 DAYS


 


 04/30/17 09:30 Aerobic Blood Culture - Preliminary





 Blood - Arm, Left    NO GROWTH AFTER 4 DAYS





 Anaerobic Blood Culture - Preliminary





    NO GROWTH AFTER 4 DAYS


 


 05/03/17 12:16 Clostridium difficile (PCR) - Final





 Stool / Feces    NEGATIVE CDIFF TOXIN











Med Orders - Current: 


 Current Medications





Acetaminophen (Tylenol Extra Strength)  1,000 mg PO Q6H PRN


   PRN Reason: Fever


   Last Admin: 05/03/17 02:02 Dose:  1,000 mg


Acetylcysteine (Mucomyst 20%)  200 mg NEB TIDRT [facility]


   Last Admin: 05/04/17 07:19 Dose:  200 mg


Al Hydroxide/Mg Hydroxide (Mag-Al Plus)  30 ml PO Q4H PRN


   PRN Reason: Heartburn


Albuterol (Proventil Neb Soln)  2.5 mg NEB Q4H PRN


   PRN Reason: Shortness Of Breath/wheezing


   Last Admin: 05/04/17 07:20 Dose:  2.5 mg


Benzonatate (Tessalon Perles)  200 mg PO TID PRN


   PRN Reason: Cough


   Last Admin: 05/01/17 20:18 Dose:  200 mg


Guaifenesin/Codeine Phosphate (Robitussin Ac)  10 ml PO Q4H PRN


   PRN Reason: Cough


   Last Admin: 05/04/17 01:45 Dose:  10 ml


Levofloxacin/Dextrose 750 mg/ (Premix)  150 mls @ 100 mls/hr IV Q24H [facility]


   Last Admin: 05/03/17 17:44 Dose:  100 mls/hr


Clindamycin Phosphate 600 mg/ (Sodium Chloride)  54 mls @ 100 mls/hr IV Q6H [facility]


   Last Admin: 05/04/17 07:12 Dose:  100 mls/hr


Ibuprofen (Motrin)  800 mg PO Q8H PRN


   PRN Reason: Fever


   Last Admin: 05/03/17 22:47 Dose:  800 mg


Lactobacillus Rhamnosus (Culturelle)  2 cap PO BID [facility]


   Last Admin: 05/04/17 09:14 Dose:  2 cap


Ondansetron HCl (Zofran Odt)  4 mg PO Q6H PRN


   PRN Reason: Nausea able to take PO


   Last Admin: 05/01/17 19:44 Dose:  4 mg


Ondansetron HCl (Zofran)  4 mg IV Q6H PRN


   PRN Reason: Nausea/Vomiting


Pantoprazole Sodium (Protonix***)  40 mg PO BIDAC [facility]


   Last Admin: 05/04/17 07:07 Dose:  40 mg


Sucralfate (Carafate)  1 gm PO QIDACANDBED [facility]


   Last Admin: 05/04/17 11:01 Dose:  1 gm


Zolpidem Tartrate (Ambien)  5 mg PO BEDTIME PRN


   PRN Reason: Insomnia





Discontinued Medications





Acetaminophen (Tylenol)  650 mg PO Q4H PRN


   PRN Reason: Pain (Mild 1-3)/fever


   Last Admin: 04/30/17 19:23 Dose:  650 mg


Acetaminophen (Tylenol)  650 mg RECTAL NOW ONE


   Stop: 04/30/17 09:25


   Last Admin: 04/30/17 09:36 Dose:  650 mg


Acetylcysteine (Mucomyst 20%)  200 mg NEB ONETIME ONE


   Stop: 05/01/17 15:01


   Last Admin: 05/01/17 14:41 Dose:  200 mg


Albuterol (Proventil Neb Soln)  2.5 mg NEB ONETIME ONE


   Stop: 04/29/17 15:42


   Last Admin: 04/29/17 15:59 Dose:  2.5 mg


Benzonatate (Tessalon Perles)  100 mg PO TID PRN


   PRN Reason: Cough


   Last Admin: 04/29/17 23:54 Dose:  100 mg


Fentanyl (Sublimaze) Confirm Administered Dose 100 mcg .ROUTE .STK-MED ONE


   Stop: 04/30/17 08:15


Sodium Chloride (Normal Saline)  1,000 mls @ 999 mls/hr IV ASDIRECTED [facility]


   Last Admin: 04/29/17 15:58 Dose:  999 mls/hr


Sodium Chloride (Normal Saline)  1,000 mls @ 125 mls/hr IV ASDIRECTED [facility]


   Last Admin: 05/01/17 08:51 Dose:  125 mls/hr


Midazolam HCl (Versed 1 Mg/Ml) Confirm Administered Dose 2 mg .ROUTE .STK-MED 

ONE


   Stop: 04/30/17 08:15


Pantoprazole Sodium (Protonix Iv***)  40 mg IV ONETIME ONE


   Stop: 04/29/17 18:40


   Last Admin: 04/29/17 19:45 Dose:  40 mg


Propofol (Diprivan  20 Ml) Confirm Administered Dose 200 mg .ROUTE .STK-MED ONE


   Stop: 04/30/17 08:15











*Q Meaningful Use (DIS)





- VTE *Q


VTE Criteria *Q: 








- Stroke *Q


Stroke Criteria *Q: 








- AMI *Q


AMI Criteria *Q:

## 2017-05-05 ENCOUNTER — HOSPITAL ENCOUNTER (INPATIENT)
Dept: HOSPITAL 11 - JP.ED | Age: 67
LOS: 4 days | Discharge: HOME | DRG: 195 | End: 2017-05-09
Attending: INTERNAL MEDICINE | Admitting: INTERNAL MEDICINE
Payer: MEDICARE

## 2017-05-05 DIAGNOSIS — Z87.01: ICD-10-CM

## 2017-05-05 DIAGNOSIS — E78.00: ICD-10-CM

## 2017-05-05 DIAGNOSIS — K21.9: ICD-10-CM

## 2017-05-05 DIAGNOSIS — R19.7: ICD-10-CM

## 2017-05-05 DIAGNOSIS — J18.9: Primary | ICD-10-CM

## 2017-05-05 DIAGNOSIS — Z79.52: ICD-10-CM

## 2017-05-05 DIAGNOSIS — H54.7: ICD-10-CM
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
## 2017-05-05 DIAGNOSIS — M10.9: ICD-10-CM

## 2017-05-05 DIAGNOSIS — R09.02: ICD-10-CM

## 2017-05-05 DIAGNOSIS — Z79.82: ICD-10-CM

## 2017-05-05 RX ADMIN — SUCRALFATE SCH GM: 1 SUSPENSION ORAL at 17:58

## 2017-05-05 RX ADMIN — TAZOBACTAM SODIUM AND PIPERACILLIN SODIUM SCH MLS/HR: 375; 3 INJECTION, SOLUTION INTRAVENOUS at 22:33

## 2017-05-05 RX ADMIN — SUCRALFATE SCH GM: 1 SUSPENSION ORAL at 20:04

## 2017-05-05 RX ADMIN — TAZOBACTAM SODIUM AND PIPERACILLIN SODIUM SCH MLS/HR: 375; 3 INJECTION, SOLUTION INTRAVENOUS at 17:05

## 2017-05-05 NOTE — PCM.HP
H&P History of Present Illness





- General


Date of Service: 05/05/17


Admit Problem/Dx: 


 Admission Diagnosis/Problem





Admission Diagnosis/Problem      Pneumonia








Source of Information: Patient, Family, Old records, Provider, RN notes reviewed


History Limitations: Reports: No limitations





- History of Present Illness


Initial Comments - Free Text/Narative: 





Mr. Trujillo is a 67-year-old gentleman who is readmitted to the hospital with 

fever, hypoxia, cough, secondary to bilateral pneumonia.  He was just 

discharged from this facility yesterday, after admission for management of 

pneumonia.  He presented on Sunday, April 30 with similar symptoms.  At that 

time his white blood cell count was elevated and there was suggestion of a 

infiltrate at the left base.  Blood cultures were obtained at the time of 

admission and remained negative throughout his hospital stay.  He was treated 

with IV levofloxacin and because of the possibility of aspiration clindamycin 

was added to that regimen.  He's had ongoing difficulty with esophageal reflux 

and was seen by Dr. Cantu during that admission, EGD was performed which did 

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  He was treated with 

Protonix IV twice daily and oral Carafate 4 times daily.  He improved over the 

next several days of hospitalization no recurrent significant fevers and marked 

improvement in his white blood cell count.  He was discharged home on oral 

antibiotic therapy with Augmentin.  Shortly after arriving home he developed 

recurrent cough, shortness of breath, and fever.  Fevers persisted over the 

past 24 hours and frequently were above 102.  He represented to the emergency 

department today, white count is markedly elevated 31,000 and he is noted to be 

hypoxic with oxygen saturation of 89% on room air.  Chest x-ray shows faint 

perihilar infiltrates.





- Related Data


Allergies/Adverse Reactions: 


 Allergies











Allergy/AdvReac Type Severity Reaction Status Date / Time


 


No Known Allergies Allergy   Verified 05/05/17 13:07











Home Medications: 


 Home Meds





Aspirin [Low Dose Aspirin EC] 81 mg PO DAILY 09/02/14 [History]


Amoxicillin/Potassium Clav [Augmentin 875-125 Tablet] 1 each PO BID #12 tablet 

05/04/17 [Rx]


Pantoprazole [ProTONIX***] 40 mg PO BIDAC #30 tab.cr 05/04/17 [Rx]


Sucralfate [Carafate] 1 gm PO QIDACANDBED #120 cup 05/04/17 [Rx]











Past Medical History


HEENT History: Reports: Cataract, Impaired vision, Otitis media


Cardiovascular History: Reports: High cholesterol, Hypertension, Other (see 

below)


Other Cardiovascular History: "[facility]"


Respiratory History: Reports: Pneumonia, recurrent


Other Respiratory History: pneumonia x2


Gastrointestinal History: Reports: Diverticulosis, GERD


Musculoskeletal History: Reports: Other (see below)


Other Musculoskeletal History: chronic right shoulder pain from playing 

softball when younger





- Infectious Disease History


Infectious Disease History: Reports: Chicken pox, Measles, Mumps





- Past Surgical History


HEENT Surgical History: Reports: Cataract surgery, Oral surgery, Other (see 

below)


Other HEENT Surgeries/Procedures: both eyes.  [facility] app[facility] in [place] on 12/6/2017


Cardiovascular Surgical History: Reports: None


Respiratory Surgical History: Reports: None


GI Surgical History: Reports: Cholecystectomy, Colonoscopy


Musculoskeletal Surgical History: Reports: Other (see below)


Other Musculoskeletal Surgeries/Procedures:: Right ankle and knee surgery





Social & Family History





- Family History


GI: Reports: Cirrhosis, GERD





- Tobacco Use


Smoking Status *Q: Never Smoker


Years of Tobacco use: 3


Used Tobacco, but Quit: Yes


Month Tobacco Last Used: 1/1969


Second Hand Smoke Exposure: No





- Caffeine Use


Caffeine Use: Reports: None





- Alcohol Use


Days Per Week of Alcohol Use: 0





- Recreational Drug Use


Recreational Drug Use: No





H&P Review of Systems





- Review of Systems:


Review Of Systems: See Below


General: Reports: fever, chills, weakness, fatigue, diaphoresis, decreased 

appetite


HEENT: Reports: no symptoms


Pulmonary: Reports: Shortness of Breath, Cough, Sputum.  Denies: Wheezing, 

Pleuritic Chest Pain, Hemoptysis


Cardiovascular: Reports: dyspnea on exertion.  Denies: chest pain, palpitations

, orthopnea, PND, edema, lightheadedness, syncope


Gastrointestinal: Reports: No symptoms


Genitourinary: Reports: no symptoms


Musculoskeletal: Reports: no symptoms


Skin: Reports: no symptoms


Psychiatric: Reports: no symptoms


Neurological: Reports: No Symptoms


Hematologic/Lymphatic: Reports: no symptoms


Immunologic: Reports: no symptoms





Exam





- Exam


Exam: See Below





- Vital Signs


Vital Signs: 


 Last Vital Signs











Temp  98.6 F   05/05/17 13:04


 


Pulse  71   05/05/17 13:04


 


Resp  14   05/05/17 13:04


 


BP  131/73   05/05/17 13:04


 


Pulse Ox  89 L  05/05/17 13:04











Weight: 187 lb 6.287 oz





- Exam


Quality Assessment: supplemental oxygen, DVT prophylaxis


General: alert, oriented, cooperative, moderate distress


HEENT: Conjunctiva clear, EOMI, Hearing intact, Mucosa moist & pink, Nares 

patent, Normal nasal septum, Posterior pharynx clear, Pupils equal, Pupils 

reactive


Neck: supple, trachea midline, +2 carotid pulse wo bruit


Lungs: Clear to auscultation, Normal respiratory effort, Rales.  No: Decreased 

breath sounds, Crackles, Rhonchi, Rub, Stridor, Wheezing


Cardiovascular: regular rate, regular rhythm, normal S1, normal S2


Abdomen: normal bowel sounds, soft


Back Exam: normal inspection, full range of motion, NT


Extremities: 3, normal inspection, 10


Skin: warm, dry, intact


Neurological: cranial nerves intact, strength equal bilateral, normal speech, 

normal tone, sensation intact.  No: focal deficit


Neuro Extensive - Mental Status: alert, oriented x3, normal mood/affect, normal 

cognition, memory intact





- Patient Data


Lab Results last 24 hrs: 


 Laboratory Results - last 24 hr











  05/05/17 05/05/17 Range/Units





  14:10 14:10 


 


WBC  31.1 H*   (4.5-11.0)  K/uL


 


RBC  4.95   (4.30-5.90)  M/uL


 


Hgb  16.0 H   (12.0-15.0)  g/dL


 


Hct  46.0   (40.0-54.0)  %


 


MCV  93   (80-98)  fL


 


MCH  32 H   (27-31)  pg


 


MCHC  35   (32-36)  %


 


Plt Count  356   (150-400)  K/uL


 


Neut % (Auto)  92 H   (36-66)  %


 


Lymph % (Auto)  5 L   (24-44)  %


 


Mono % (Auto)  3   (2-6)  %


 


Eos % (Auto)  0 L   (2-4)  %


 


Baso % (Auto)  0   (0-1)  %


 


Sodium   136 L  (140-148)  mmol/L


 


Potassium   3.7  (3.6-5.2)  mmol/L


 


Chloride   100  (100-108)  mmol/L


 


Carbon Dioxide   27  (21-32)  mmol/L


 


Anion Gap   12.7  (5.0-14.0)  mmol/L


 


BUN   10  (7-18)  mg/dL


 


Creatinine   1.2  (0.8-1.3)  mg/dL


 


Est Cr Clr Drug Dosing   60.83  mL/min


 


Estimated GFR (MDRD)   > 60  (>60)  


 


Glucose   93  ()  mg/dL


 


Calcium   8.4 L  (8.5-10.1)  mg/dL











Result Diagrams: 


 05/05/17 14:10





 05/05/17 14:10





*Q Meaningful Use (ADM)





- VTE *Q


VTE Criteria *Q: 








- VTE Risk Assess *Q


Each Risk Factor Represents 1 Point: Serious Lung Disease Including Pneumonia, 

Less than 1 Month


Total Score 1 Point Risk Factors: 1


Each Risk Factor Represents 2 Points: Age 60 - 74 Years


Total Score 2 Point Risk Factors: 2


Each Risk Factor Represents 3 Points: None


Total Score 3 Point Risk Factors: 0


Each Risk Factor Represents 5 Points: None


Total Score 5 Point Risk Factors: 0


Venous Thromboembolism Risk Factor Score *Q: 3





- Stroke *Q


Stroke Criteria *Q: 








- AMI *Q


AMI Criteria *Q: 





Problem List Initiated/Reviewed/Updated: Yes


Orders Last 24hrs: 


 Active Orders 24 hr











 Category Date Time Status


 


 Patient Status Manage Transfer [TRANSFER] Routine ADT  05/05/17 15:13 Ordered


 


 Ang Chest [CT] Stat Exams  05/05/17 15:12 Ordered


 


 CULTURE BLOOD [BC] Urgent Lab  05/05/17 14:10 Received


 


 CULTURE BLOOD [BC] Urgent Lab  05/05/17 14:20 Received


 


 Blood Culture x2 Reflex Set [OM.PC] Urgent Oth  05/05/17 13:59 Ordered


 


 Resuscitation Status Routine Resus Stat  05/05/17 15:15 Ordered











Assessment/Plan Comment:: 





ASSESSMENT AND PLAN





[facility]-recent hospitalization for pneumonia

, infiltrate noted left base on admission.  Now has developed recurrent 

symptoms over the past 24 hours with significant temperature elevation and 

marked elevation in white blood cell count.  Chest x-ray shows bilateral 

perihilar infiltrates.


-Blood cultures pending


-Sputum cultures ordered


-CT scan of the chest with PE protocol


-Broad-spectrum IV antibiotics, vancomycin, Zosyn, levofloxacin, pending 

culture results


-IV fluids for hydration, reassess in a.m.





HYPOXIA-secondary to current pneumonia


-Supplemental oxygen as needed


-Nebulized albuterol and duo nebs as needed





ESOPHAGEAL REFLUX


-Protonix 40 mg by mouth twice a day


-Carafate 1 g by mouth 4 times a day





MAINTENANCE ISSUES


-DVT prophylaxis; Lovenox 40 mg subcutaneous daily


-GI prophylaxis; Protonix as above


-Blanchard catheter; not indicated


-Nutrition; regular diet


-Nicotine dependence; not required





CODE STATUS-FULL CODE





ADMISSION STATUS-patient will be admitted to inpatient status, expect at least 

a 2 night hospital stay for evaluation and management of problems as outlined 

above.  At the time of this admission I do not reasonably expected evaluation 

and management of this problem will require more than a 96 hour hospital stay.





DISPOSITION-anticipate discharge to home after the hospital stay.





PRIMARY CARE PROVIDER-Dr. Ledbetter

## 2017-05-05 NOTE — EDM.PDOC
[id number]   4d


DIZZY  FEVER


Time Seen by Provider: 05/05/17 13:30


Source of Information: Reports: Patient, Family


History Limitations: Reports: No limitations





- History of Present Illness


INITIAL COMMENTS - FREE TEXT/NARRATIVE: 





59-year-old male who was discharged yesterday after 5 days of treatment for 

pneumonia is in today for a recheck because he has spiked a fever 3 times over 

the past 12 hours since discharge.  His blood pressure is also very labile.  He 

has some mild dizziness and intermittent shortness of breath.  Prior to coming 

in he took a long hot shower and had a persistent cough with some sputum 

production and now feels much better.


Severity: mild


Improves with: Reports: Other (Apparently a hot shower with vigorous coughing 

has improved his breathing)


Associated Symptoms: Reports: fever/chills, shortness of breath, weakness, 

other (Dizziness, intermittent).  Denies: headaches


  ** 0


Pain Score (Numeric/FACES): 0





- Related Data


 Allergies











Allergy/AdvReac Type Severity Reaction Status Date / Time


 


No Known Allergies Allergy   Verified 05/05/17 13:07











Home Meds: 


 Home Meds





Aspirin [Low Dose Aspirin EC] 81 mg PO DAILY 09/02/14 [History]


Amoxicillin/Potassium Clav [Augmentin 875-125 Tablet] 1 each PO BID #12 tablet 

05/04/17 [Rx]


Pantoprazole [ProTONIX***] 40 mg PO BIDAC #30 tab.cr 05/04/17 [Rx]


Sucralfate [Carafate] 1 gm PO QIDACANDBED #120 cup 05/04/17 [Rx]











Past Medical History


HEENT History: Reports: Cataract, Impaired vision, Otitis media


Cardiovascular History: Reports: High cholesterol, Hypertension, Other (see 

below)


Other Cardiovascular History: "[facility]"


Respiratory History: Reports: Pneumonia, recurrent


Other Respiratory History: pneumonia x2


Gastrointestinal History: Reports: Diverticulosis, GERD


Musculoskeletal History: Reports: Other (see below)


Other Musculoskeletal History: chronic right shoulder pain from playing 

softball when younger





- Infectious Disease History


Infectious Disease History: Reports: Chicken pox, Measles, Mumps





- Past Surgical History


HEENT Surgical History: Reports: Cataract surgery, Oral surgery, Other (see 

below)


Other HEENT Surgeries/Procedures: both eyes.  Some [facility] problems - [facility], has an appt in [place] on 12/6/2017


Cardiovascular Surgical History: Reports: None


Respiratory Surgical History: Reports: None


GI Surgical History: Reports: Cholecystectomy, Colonoscopy


Musculoskeletal Surgical History: Reports: Other (see below)


Other Musculoskeletal Surgeries/Procedures:: Right ankle and knee surgery





Social & Family History





- Family History


GI: Reports: Cirrhosis, GERD





- Tobacco Use


Smoking Status *Q: Former Smoker


Years of Tobacco use: 3


Used Tobacco, but Quit: Yes


Month Tobacco Last Used: 1/1969


Second Hand Smoke Exposure: No





- Caffeine Use


Caffeine Use: Reports: None





- Alcohol Use


Days Per Week of Alcohol Use: 0





- Recreational Drug Use


Recreational Drug Use: No





ED ROS GENERAL





- Review of Systems


Review Of Systems: See Below


Constitutional: Reports: fever, chills, malaise


HEENT: Denies: Ear pain, Throat pain


Respiratory: Reports: Shortness of Breath, Cough, Sputum


Cardiovascular: Denies: Chest pain


Endocrine: Reports: fatigue


GI/Abdominal: Denies: Abdominal pain, Nausea, Vomiting


Skin: Reports: no symptoms


Neurological: Reports: Dizziness.  Denies: Headache


Psychiatric: Reports: Anxiety





ED EXAM, GENERAL





- Physical Exam


Exam: See Below


Exam Limited By: No limitations


General Appearance: alert, no apparent distress


Respiratory/Chest: no respiratory distress, lungs clear


Cardiovascular: regular rate, rhythm


Extremities: normal inspection


Neurological: alert, oriented





Course





- Vital Signs


Last Recorded V/S: 


 Last Vital Signs











Temp  97.3 F   05/06/17 07:05


 


Pulse  58 L  05/06/17 07:05


 


Resp  20   05/06/17 07:05


 


BP  135/77   05/06/17 07:05


 


Pulse Ox  90 L  05/06/17 07:05














- Orders/Labs/Meds


Orders: 


 Active Orders 24 hr











 Category Date Time Status


 


 Ang Chest [CT] Stat Exams  05/05/17 15:12 Taken


 


 CULTURE BLOOD [BC] Urgent Lab  05/05/17 14:10 Received


 


 CULTURE BLOOD [BC] Urgent Lab  05/05/17 14:20 Received


 


 Blood Culture x2 Reflex Set [OM.PC] Urgent Oth  05/05/17 13:59 Ordered








 Medication Orders





Acetaminophen (Tylenol)  650 mg PO Q4H PRN


   PRN Reason: Pain (Mild 1-3)/fever


Albuterol (Proventil Neb Soln)  2.5 mg NEB Q4H PRN


   PRN Reason: Shortness Of Breath/wheezing


Albuterol/Ipratropium (Duoneb 3.0-0.5 Mg/3 Ml)  3 ml NEB QIDRT [facility]


   Last Admin: 05/06/17 07:20  Dose: 3 ml


   Admin: 05/05/17 21:03  Dose: 3 ml


Aspirin (Halfprin)  81 mg PO DAILY [facility]


Docusate Sodium (Colace)  100 mg PO BID PRN


   PRN Reason: Constipation


Enoxaparin Sodium (Lovenox)  40 mg SUBCUT Q24H [facility]


   Last Admin: 05/05/17 17:58  Dose: 40 mg


Piperacillin/Tazobactam/ (Dextrose 3.375 gm/ Premix)  50 mls @ 100 mls/hr IV 

Q6H [facility]


   Last Admin: 05/06/17 04:43  Dose: 100 mls/hr


   Admin: 05/05/17 22:33  Dose: 100 mls/hr


   Admin: 05/05/17 17:05  Dose: 100 mls/hr


Sodium Chloride (Normal Saline)  1,000 mls @ 125 mls/hr IV ASDIRECTED [facility]


   Last Admin: 05/06/17 02:52  Dose: 125 mls/hr


   Infusion: 05/06/17 01:07  Dose: 125 mls/hr


   Admin: 05/05/17 17:07  Dose: 125 mls/hr


Vancomycin HCl 1.3 gm/ Sodium (Chloride)  250 mls @ 167 mls/hr IV Q12H [facility]


   Last Admin: 05/06/17 06:05  Dose: 167 mls/hr


Ibuprofen (Motrin)  600 mg PO Q6H PRN


   PRN Reason: Pain/Fever


   Last Admin: 05/06/17 02:52  Dose: 600 mg


   Admin: 05/05/17 17:05  Dose: 600 mg


Magnesium Hydroxide (Milk Of Magnesia)  30 ml PO Q12H PRN


   PRN Reason: Constipation


Melatonin (Melatonin)  9 mg PO BEDTIME [facility]


   Last Admin: 05/05/17 21:06  Dose: 9 mg


Ondansetron HCl (Zofran)  4 mg IV Q4H PRN


   PRN Reason: Nausea/Vomiting


Oxycodone HCl (Oxycodone)  5 mg PO Q4H PRN


   PRN Reason: Pain (moderate 4-6)


Pantoprazole Sodium (Protonix***)  40 mg PO BIDAC [facility]


   Last Admin: 05/06/17 06:58  Dose: 40 mg


   Admin: 05/05/17 17:58  Dose: 40 mg


Polyethylene Glycol (Miralax)  17 gm PO DAILY PRN


   PRN Reason: Constipation


Sodium Chloride (Saline Flush)  10 ml FLUSH ASDIRECTED PRN


   PRN Reason: Keep Vein Open


Sucralfate (Carafate)  1 gm PO QIDACANDBED CITLALY


   Last Admin: 05/06/17 06:58  Dose: 1 gm


   Admin: 05/05/17 20:04  Dose: 1 gm


   Admin: 05/05/17 17:58  Dose: 1 gm








Labs: 


 Laboratory Tests











  05/05/17 05/05/17 Range/Units





  14:10 14:10 


 


WBC  31.1 H*   (4.5-11.0)  K/uL


 


RBC  4.95   (4.30-5.90)  M/uL


 


Hgb  16.0 H   (12.0-15.0)  g/dL


 


Hct  46.0   (40.0-54.0)  %


 


MCV  93   (80-98)  fL


 


MCH  32 H   (27-31)  pg


 


MCHC  35   (32-36)  %


 


Plt Count  356   (150-400)  K/uL


 


Neut % (Auto)  92 H   (36-66)  %


 


Lymph % (Auto)  5 L   (24-44)  %


 


Mono % (Auto)  3   (2-6)  %


 


Eos % (Auto)  0 L   (2-4)  %


 


Baso % (Auto)  0   (0-1)  %


 


Sodium   136 L  (140-148)  mmol/L


 


Potassium   3.7  (3.6-5.2)  mmol/L


 


Chloride   100  (100-108)  mmol/L


 


Carbon Dioxide   27  (21-32)  mmol/L


 


Anion Gap   12.7  (5.0-14.0)  mmol/L


 


BUN   10  (7-18)  mg/dL


 


Creatinine   1.2  (0.8-1.3)  mg/dL


 


Est Cr Clr Drug Dosing   60.83  mL/min


 


Estimated GFR (MDRD)   > 60  (>60)  


 


Glucose   93  ()  mg/dL


 


Calcium   8.4 L  (8.5-10.1)  mg/dL











Meds: 


Medications











Generic Name Dose Route Start Last Admin





  Trade Name Freq  PRN Reason Stop Dose Admin


 


Acetaminophen  650 mg  05/05/17 15:58  





  Tylenol  PO   





  Q4H PRN   





  Pain (Mild 1-3)/fever   


 


Albuterol  2.5 mg  05/05/17 15:58  





  Proventil Neb Soln  NEB   





  Q4H PRN   





  Shortness Of Breath/wheezing   


 


Albuterol/Ipratropium  3 ml  05/05/17 21:00  05/06/17 07:20





  Duoneb 3.0-0.5 Mg/3 Ml  NEB   3 ml





  QIDRT CITLALY   Administration


 


Aspirin  81 mg  05/06/17 09:00  





  Halfprin  PO   





  DAILY CITLALY   


 


Docusate Sodium  100 mg  05/05/17 15:58  





  Colace  PO   





  BID PRN   





  Constipation   


 


Enoxaparin Sodium  40 mg  05/05/17 17:00  05/05/17 17:58





  Lovenox  SUBCUT   40 mg





  Q24H CITLALY   Administration


 


Piperacillin/Tazobactam/  50 mls @ 100 mls/hr  05/05/17 17:00  05/06/17 04:43





  Dextrose 3.375 gm/ Premix  IV   100 mls/hr





  Q6H CITLALY   Administration


 


Sodium Chloride  1,000 mls @ 125 mls/hr  05/05/17 15:58  05/06/17 02:52





  Normal Saline  IV   125 mls/hr





  ASDIRECTED CITLALY   Administration


 


Vancomycin HCl 1.3 gm/ Sodium  250 mls @ 167 mls/hr  05/06/17 06:00  05/06/17 06

:05





  Chloride  IV   167 mls/hr





  Q12H CITLALY   Administration


 


Ibuprofen  600 mg  05/05/17 15:58  05/06/17 02:52





  Motrin  PO   600 mg





  Q6H PRN   Administration





  Pain/Fever   


 


Magnesium Hydroxide  30 ml  05/05/17 15:58  





  Milk Of Magnesia  PO   





  Q12H PRN   





  Constipation   


 


Melatonin  9 mg  05/05/17 21:00  05/05/17 21:06





  Melatonin  PO   9 mg





  BEDTIME CITLALY   Administration


 


Ondansetron HCl  4 mg  05/05/17 15:58  





  Zofran  IV   





  Q4H PRN   





  Nausea/Vomiting   


 


Oxycodone HCl  5 mg  05/05/17 15:58  





  Oxycodone  PO   





  Q4H PRN   





  Pain (moderate 4-6)   


 


Pantoprazole Sodium  40 mg  05/05/17 16:30  05/06/17 06:58





  Protonix***  PO   40 mg





  BIDAC CITLALY   Administration


 


Polyethylene Glycol  17 gm  05/05/17 15:58  





  Miralax  PO   





  DAILY PRN   





  Constipation   


 


Sodium Chloride  10 ml  05/05/17 15:58  





  Saline Flush  FLUSH   





  ASDIRECTED PRN   





  Keep Vein Open   


 


Sucralfate  1 gm  05/05/17 17:00  05/06/17 06:58





  Carafate  PO   1 gm





  QIDACANDBED CITLALY   Administration














Discontinued Medications














Generic Name Dose Route Start Last Admin





  Trade Name Freq  PRN Reason Stop Dose Admin


 


Albuterol/Ipratropium  3 ml  05/05/17 16:30  05/05/17 17:58





  Duoneb 3.0-0.5 Mg/3 Ml  NEB  05/05/17 16:31  3 ml





  ONETIME ONE   Administration


 


Sodium Chloride  100 mls @ 4 mls/sec  05/05/17 15:30  05/05/17 15:34





  Normal Saline  IV  05/05/17 16:30  4 mls/sec





  ASDIRECTED CITLALY   Administration


 


Vancomycin HCl 1.7 gm/ Sodium  250 mls @ 125 mls/hr  05/05/17 18:00  05/05/17 18

:15





  Chloride  IV  05/05/17 19:59  125 mls/hr





  ONETIME ONE   Administration


 


Iopamidol  100 ml  05/05/17 15:30  05/05/17 15:34





  Isovue-370 (76%)  IV  05/05/17 16:30  100 ml





  .AS DIRECTED CITLALY   Administration


 


Vancomycin HCl  1 gm  05/05/17 16:00  





  Vancomycin  IV  05/05/17 18:00  





  .PHARMACY TO DOSE CITLALY   














- Re-Assessments/Exams


Free Text/Narrative Re-Assessment/Exam: 





05/05/17 13:48


Patient's vitals were excellent on arrival to ER with a blood pressure that was 

normal and a temperature 98.6.  A two-view chest x-ray was obtained.


05/05/17 14:36


Chest x-ray now shows bilateral upper lobe infiltrates.  White count is 31,000.

  I asked Dr. Greer of the hospital service to see the patient for 

reassessment and possible admission.





Departure





- Departure


Time of Disposition: 16:17


Disposition: Admitted As Inpatient 65


Condition: fair


Clinical Impression: 


Pneumonia


Qualifiers:


 Pneumonia type: due to unspecified organism Laterality: bilateral Lung location

: upper lobe of lung Qualified Code(s): J18.9 - Pneumonia, unspecified organism








- My Orders


Last 24 Hours: 


My Active Orders





05/05/17 13:59


Blood Culture x2 Reflex Set [OM.PC] Urgent 





05/05/17 14:10


CULTURE BLOOD [BC] Urgent 





05/05/17 14:20


CULTURE BLOOD [BC] Urgent 














- Assessment/Plan


Last 24 Hours: 


My Active Orders





05/05/17 13:59


Blood Culture x2 Reflex Set [OM.PC] Urgent 





05/05/17 14:10


CULTURE BLOOD [BC] Urgent 





05/05/17 14:20


CULTURE BLOOD [BC] Urgent

## 2017-05-05 NOTE — CR
Chest 2V

 

HISTORY: Dyspnea

 

COMPARISON: 4/29/2017.

 

FINDINGS: Diffuse airspace disease in the peripheral margin of the right and left midlung zone. No s
ignificant effusion. Cardiac size normal. No acute congestive change.

 

Impression:

 

Subtle diffuse airspace disease bilaterally.

## 2017-05-06 RX ADMIN — SODIUM CHLORIDE SCH MLS/HR: 9 INJECTION, SOLUTION INTRAVENOUS at 06:05

## 2017-05-06 RX ADMIN — SUCRALFATE SCH GM: 1 SUSPENSION ORAL at 06:58

## 2017-05-06 RX ADMIN — SUCRALFATE SCH GM: 1 SUSPENSION ORAL at 16:48

## 2017-05-06 RX ADMIN — SUCRALFATE SCH GM: 1 SUSPENSION ORAL at 11:07

## 2017-05-06 RX ADMIN — TAZOBACTAM SODIUM AND PIPERACILLIN SODIUM SCH MLS/HR: 375; 3 INJECTION, SOLUTION INTRAVENOUS at 11:08

## 2017-05-06 RX ADMIN — TAZOBACTAM SODIUM AND PIPERACILLIN SODIUM SCH MLS/HR: 375; 3 INJECTION, SOLUTION INTRAVENOUS at 22:48

## 2017-05-06 RX ADMIN — SODIUM CHLORIDE SCH MLS/HR: 9 INJECTION, SOLUTION INTRAVENOUS at 17:58

## 2017-05-06 RX ADMIN — TAZOBACTAM SODIUM AND PIPERACILLIN SODIUM SCH MLS/HR: 375; 3 INJECTION, SOLUTION INTRAVENOUS at 04:43

## 2017-05-06 RX ADMIN — TAZOBACTAM SODIUM AND PIPERACILLIN SODIUM SCH MLS/HR: 375; 3 INJECTION, SOLUTION INTRAVENOUS at 17:03

## 2017-05-06 RX ADMIN — SUCRALFATE SCH GM: 1 SUSPENSION ORAL at 20:29

## 2017-05-06 NOTE — PCM.PN
- General Info


Date of Service: 05/06/17


Functional Status: Reports: tolerating diet, urinating





- Review of Systems


General: Reports: Weakness.  Denies: Fever, Chills


Pulmonary: Reports: shortness of breath, cough, sputum.  Denies: pleuritic 

chest pain, hemoptysis, wheezing


Cardiovascular: Reports: Dyspnea on Exertion.  Denies: Chest Pain, Palpitations

, Orthopnea, PND, Edema


Gastrointestinal: Reports: No symptoms


Systems Review Comment:: 





This patient has improved mildly since admission yesterday, there've been no 

significant temperature elevations.  Vital signs have been stable and he has 

remained afebrile.  White blood cell count remains elevated but is improved 

from yesterday, he is also less short of breath with significant decrease in 

cough.





- Patient Data


Vitals - most recent: 


 Last Vital Signs











Temp  97.3 F   05/06/17 07:05


 


Pulse  58 L  05/06/17 07:05


 


Resp  20   05/06/17 07:05


 


BP  135/77   05/06/17 07:05


 


Pulse Ox  90 L  05/06/17 07:05











Weight - most recent: 187 lb 6.287 oz


I&O - last 24 hours: 


 Intake & Output











 05/05/17 05/06/17 05/06/17





 22:59 06:59 14:59


 


Intake Total 350 2031 50


 


Output Total 400 600 


 


Balance -50 1431 50











Lab Results last 24 hrs: 


 Laboratory Results - last 24 hr











  05/05/17 05/06/17 05/06/17 Range/Units





  16:44 04:26 04:26 


 


WBC   17.3 H   (4.5-11.0)  K/uL


 


RBC   4.02 L   (4.30-5.90)  M/uL


 


Hgb   13.3  D   (12.0-15.0)  g/dL


 


Hct   37.7 L   (40.0-54.0)  %


 


MCV   94   (80-98)  fL


 


MCH   33 H   (27-31)  pg


 


MCHC   35   (32-36)  %


 


Plt Count   295   (150-400)  K/uL


 


Neut % (Auto)   84 H   (36-66)  %


 


Lymph % (Auto)   9 L   (24-44)  %


 


Mono % (Auto)   4   (2-6)  %


 


Eos % (Auto)   3   (2-4)  %


 


Baso % (Auto)   0   (0-1)  %


 


Sodium    139 L  (140-148)  mmol/L


 


Potassium    3.8  (3.6-5.2)  mmol/L


 


Chloride    103  (100-108)  mmol/L


 


Carbon Dioxide    28  (21-32)  mmol/L


 


Anion Gap    11.8  (5.0-14.0)  mmol/L


 


BUN    11  (7-18)  mg/dL


 


Creatinine    1.2  (0.8-1.3)  mg/dL


 


Est Cr Clr Drug Dosing    60.83  mL/min


 


Estimated GFR (MDRD)    > 60  (>60)  


 


Glucose    97  ()  mg/dL


 


Calcium    7.6 L  (8.5-10.1)  mg/dL


 


Magnesium    1.6 L  (1.8-2.4)  mg/dL


 


Total Bilirubin    0.9  (0.2-1.0)  mg/dL


 


AST    20  (15-37)  U/L


 


ALT    36  (12-78)  U/L


 


Alkaline Phosphatase    112  ()  U/L


 


Total Protein    5.8 L  (6.4-8.2)  g/dL


 


Albumin    2.1 L  (3.4-5.0)  g/dL


 


Globulin    3.7 H  (2.3-3.5)  g/dL


 


Albumin/Globulin Ratio    0.6 L  (1.2-2.2)  


 


Urine Color  Yellow    


 


Urine Appearance  Clear    


 


Urine pH  7.0    (4.5-8.0)  


 


Ur Specific Gravity  1.010    (1.008-1.030)  


 


Urine Protein  Negative    (NEGATIVE)  mg/dL


 


Urine Glucose (UA)  Normal    (NEGATIVE)  mg/dL


 


Urine Ketones  15 H    (NEGATIVE)  mg/dL


 


Urine Occult Blood  Negative    (NEGATIVE)  


 


Urine Nitrite  Negative    (NEGAITVE)  


 


Urine Bilirubin  Negative    (NEGATIVE)  


 


Urine Urobilinogen  Normal    (NORMAL)  mg/dL


 


Ur Leukocyte Esterase  Negative    (NEGATIVE)  


 


Urine RBC  0-5    (0-5)  


 


Urine WBC  0-5    (0-5)  


 


Ur Epithelial Cells  Rare    


 


Amorphous Sediment  Not seen    


 


Urine Bacteria  Not seen    


 


Urine Mucus  Not seen    











Will Results last 24 hrs: 


 Microbiology











 05/05/17 16:28 Gram Stain - Final





 Sputum - Expectorated 











Med Orders - Current: 


 Current Medications





Acetaminophen (Tylenol)  650 mg PO Q4H PRN


   PRN Reason: Pain (Mild 1-3)/fever


Albuterol (Proventil Neb Soln)  2.5 mg NEB Q4H PRN


   PRN Reason: Shortness Of Breath/wheezing


Albuterol/Ipratropium (Duoneb 3.0-0.5 Mg/3 Ml)  3 ml NEB QIDRT [facility]


   Last Admin: 05/06/17 07:20 Dose:  3 ml


Aspirin (Halfprin)  81 mg PO DAILY [facility]


   Last Admin: 05/06/17 08:59 Dose:  81 mg


Docusate Sodium (Colace)  100 mg PO BID PRN


   PRN Reason: Constipation


Enoxaparin Sodium (Lovenox)  40 mg SUBCUT Q24H [facility]


   Last Admin: 05/05/17 17:58 Dose:  40 mg


Guaifenesin/Codeine Phosphate (Robitussin Ac)  10 ml PO Q4H PRN


   PRN Reason: Cough


Piperacillin/Tazobactam/ (Dextrose 3.375 gm/ Premix)  50 mls @ 100 mls/hr IV 

Q6H [facility]


   Last Admin: 05/06/17 04:43 Dose:  100 mls/hr


Vancomycin HCl 1.3 gm/ Sodium (Chloride)  250 mls @ 167 mls/hr IV Q12H [facility]


   Last Admin: 05/06/17 06:05 Dose:  167 mls/hr


Magnesium Sulfate 2 gm/ Premix  50 mls @ 25 mls/hr IV ONETIME ONE


   Stop: 05/06/17 10:59


   Last Admin: 05/06/17 08:57 Dose:  25 mls/hr


Ibuprofen (Motrin)  600 mg PO Q6H PRN


   PRN Reason: Pain/Fever


   Last Admin: 05/06/17 02:52 Dose:  600 mg


Magnesium Hydroxide (Milk Of Magnesia)  30 ml PO Q12H PRN


   PRN Reason: Constipation


Magnesium Oxide (Magnesium Oxide)  400 mg PO BID [facility]


   Last Admin: 05/06/17 08:59 Dose:  400 mg


Melatonin (Melatonin)  9 mg PO BEDTIME [facility]


   Last Admin: 05/05/17 21:06 Dose:  9 mg


Ondansetron HCl (Zofran)  4 mg IV Q4H PRN


   PRN Reason: Nausea/Vomiting


Oxycodone HCl (Oxycodone)  5 mg PO Q4H PRN


   PRN Reason: Pain (moderate 4-6)


Pantoprazole Sodium (Protonix***)  40 mg PO BIDAC [facility]


   Last Admin: 05/06/17 06:58 Dose:  40 mg


Polyethylene Glycol (Miralax)  17 gm PO DAILY PRN


   PRN Reason: Constipation


Sodium Chloride (Saline Flush)  10 ml FLUSH ASDIRECTED PRN


   PRN Reason: Keep Vein Open


Sucralfate (Carafate)  1 gm PO QIDACANDBED [facility]


   Last Admin: 05/06/17 06:58 Dose:  1 gm





Discontinued Medications





Albuterol/Ipratropium (Duoneb 3.0-0.5 Mg/3 Ml)  3 ml NEB ONETIME ONE


   Stop: 05/05/17 16:31


   Last Admin: 05/05/17 17:58 Dose:  3 ml


Sodium Chloride (Normal Saline)  100 mls @ 4 mls/sec IV ASDIRECTED [facility]


   Stop: 05/05/17 16:30


   Last Admin: 05/05/17 15:34 Dose:  4 mls/sec


Sodium Chloride (Normal Saline)  1,000 mls @ 125 mls/hr IV ASDIRECTED [facility]


   Last Admin: 05/06/17 02:52 Dose:  125 mls/hr


Vancomycin HCl 1.7 gm/ Sodium (Chloride)  250 mls @ 125 mls/hr IV ONETIME ONE


   Stop: 05/05/17 19:59


   Last Admin: 05/05/17 18:15 Dose:  125 mls/hr


Magnesium Sulfate 2 gm/ Premix  50 mls @ 25 mls/hr IV ONETIME ONE


   Stop: 05/06/17 11:59


Iopamidol (Isovue-370 (76%))  100 ml IV .AS DIRECTED [facility]


   Stop: 05/05/17 16:30


   Last Admin: 05/05/17 15:34 Dose:  100 ml


Vancomycin HCl (Vancomycin)  1 gm IV .PHARMACY TO DOSE [facility]


   Stop: 05/05/17 18:00











- Exam


Quality Assessment: DVT prophylaxis


General: alert, oriented, cooperative


Lungs: Normal respiratory effort, Rales.  No: Crackles, Rhonchi, Rub, Stridor, 

Wheezing


Cardiovascular: Regular Rate, Regular Rhythm, No Murmurs


Abdomen: bowel sounds present, soft, no tenderness, no distension


Extremities: no edema


Skin: warm, dry, intact





- Problem List Review


Problem List Initiated/Reviewed/Updated: Yes





- My Orders


Last 24 Hours: 


My Active Orders





05/05/17 15:15


Resuscitation Status Routine 





05/05/17 15:58


Patient Status [ADT] Routine 


Intake and Output [RC] QSHIFT 


Notify Provider Vital Signs [RC] ASDIRECTED 


Oxygen Therapy [RC] PRN 


Peripheral IV Care [RC] .AS DIRECTED 


RT Aerosol Therapy [RC] ASDIRECTED 


Up ad Rbe [RC] ASDIRECTED 


VTE/DVT Education [RC] Per Unit Routine 


Vital Signs [RC] Q4H 


Acetaminophen [Tylenol]   650 mg PO Q4H PRN 


Albuterol [Proventil Neb Soln]   2.5 mg NEB Q4H PRN 


Docusate Sodium [Colace]   100 mg PO BID PRN 


Ibuprofen [Motrin]   600 mg PO Q6H PRN 


Magnesium Hydroxide [Milk of Magnesia]   30 ml PO Q12H PRN 


Ondansetron [Zofran]   4 mg IV Q4H PRN 


Polyethylene Glycol 3350 [MiraLAX]   17 gm PO DAILY PRN 


Sodium Chloride 0.9% [Saline Flush]   10 ml FLUSH ASDIRECTED PRN 


oxyCODONE   5 mg PO Q4H PRN 


Peripheral IV Insertion Adult [OM.PC] Routine 





05/05/17 16:28


CULTURE RESPIRATORY + SMEAR [RM] Stat 





05/05/17 17:00


Enoxaparin [Lovenox]   40 mg SUBCUT Q24H 


Piperacillin/Tazobactam/Dext [Zosyn in Dextrose Iso-Osmotic 3.375 GM] 3.375 gm 

  Premix Bag 1 bag IV Q6H 





05/05/17 17:34


Incentive Spirometry [RT Incentive Spirometry] [RC] ASDIRECTED 





05/05/17 17:35


RT Acapella [RESPCARE] Routine 





05/05/17 21:00


Albuterol/Ipratropium [DuoNeb 3.0-0.5 MG/3 ML]   3 ml NEB QIDRT 


Melatonin   9 mg PO BEDTIME 





05/05/17 Lunch


Regular Diet [DIET] 





05/06/17 06:00


Vancomycin 1.3 gm   Sodium Chloride 0.9% [Normal Saline] 250 ml IV Q12H 





05/06/17 09:00


Magnesium Oxide   400 mg PO BID 


Magnesium Sulfate/Water [Magnesium Sulfate 2 GM in Water 50 ML] 2 gm   Premix 

Bag 1 bag IV ONETIME 





05/06/17 10:02


Codeine/guaiFENesin [Robitussin AC]   10 ml PO Q4H PRN 


Convert IV to Saline Lock [OM.PC] Routine 





05/07/17 05:00


BASIC METABOLIC PANEL,BMP [CHEM] Timed 


CBC WITH AUTO DIFF [HEME] Timed 


MAGNESIUM [CHEM] Timed 





05/07/17 17:30


VANCOMYCIN TROUGH [CHEM] Routine 














- Plan


Plan:: 





ASSESSMENT AND PLAN





[facility] PNEUMONIA-recent hospitalization for 

pneumonia.  Marked improvement since admission with less shortness of breath 

and cough.  White count has improved and he is had only very mild temperature 

elevations.  CT scan of the chest did show bilateral pulmonary infiltrates.


-Blood cultures pending


-Sputum cultures ordered


-Broad-spectrum IV antibiotics, vancomycin, Zosyn, levofloxacin, pending 

culture results


-Saline lock IV





HYPOXIA-secondary to current pneumonia


-Supplemental oxygen as needed


-Nebulized albuterol and duo nebs as needed





ESOPHAGEAL REFLUX


-Protonix 40 mg by mouth twice a day


-Carafate 1 g by mouth 4 times a day





MAINTENANCE ISSUES


-DVT prophylaxis; Lovenox 40 mg subcutaneous daily


-GI prophylaxis; Protonix as above


-Blanchard catheter; not indicated


-Nutrition; regular diet


-Nicotine dependence; not required





CODE STATUS-FULL CODE





ADMISSION STATUS-patient will be admitted to inpatient status, expect at least 

a 2 night hospital stay for evaluation and management of problems as outlined 

above.  At the time of this admission I do not reasonably expected evaluation 

and management of this problem will require more than a 96 hour hospital stay.





DISPOSITION-anticipate discharge to home after the hospital stay.





PRIMARY CARE PROVIDER-Dr. Ledbetter

## 2017-05-07 RX ADMIN — SUCRALFATE SCH GM: 1 SUSPENSION ORAL at 17:06

## 2017-05-07 RX ADMIN — TAZOBACTAM SODIUM AND PIPERACILLIN SODIUM SCH MLS/HR: 375; 3 INJECTION, SOLUTION INTRAVENOUS at 17:06

## 2017-05-07 RX ADMIN — Medication SCH CAP: at 20:05

## 2017-05-07 RX ADMIN — TAZOBACTAM SODIUM AND PIPERACILLIN SODIUM SCH MLS/HR: 375; 3 INJECTION, SOLUTION INTRAVENOUS at 11:18

## 2017-05-07 RX ADMIN — TAZOBACTAM SODIUM AND PIPERACILLIN SODIUM SCH MLS/HR: 375; 3 INJECTION, SOLUTION INTRAVENOUS at 05:30

## 2017-05-07 RX ADMIN — SUCRALFATE SCH GM: 1 SUSPENSION ORAL at 20:03

## 2017-05-07 RX ADMIN — SODIUM CHLORIDE SCH MLS/HR: 9 INJECTION, SOLUTION INTRAVENOUS at 06:06

## 2017-05-07 RX ADMIN — Medication SCH CAP: at 12:29

## 2017-05-07 RX ADMIN — SUCRALFATE SCH GM: 1 SUSPENSION ORAL at 11:18

## 2017-05-07 RX ADMIN — SODIUM CHLORIDE SCH MLS/HR: 9 INJECTION, SOLUTION INTRAVENOUS at 18:26

## 2017-05-07 RX ADMIN — SUCRALFATE SCH GM: 1 SUSPENSION ORAL at 07:17

## 2017-05-07 NOTE — PCM.PN
- General Info


Date of Service: 05/07/17


Functional Status: Reports: tolerating diet, ambulating, urinating





- Review of Systems


General: Reports: Weakness.  Denies: Fever, Chills


Pulmonary: Reports: shortness of breath, cough.  Denies: pleuritic chest pain, 

sputum, hemoptysis, wheezing


Cardiovascular: Reports: Dyspnea on Exertion.  Denies: Chest Pain, Palpitations

, Orthopnea, PND, Edema, Lightheadedness


Gastrointestinal: Reports: No symptoms


Systems Review Comment:: 





This patient has felt well over the past 24 hours, there've been no recurrent 

temperature elevations of significance and shortness of breath has overall 

improved.  He did have one episode of increased shortness of breath this 

morning but that's now resolved, cough has been better.  Vital signs stable.





- Patient Data


Vitals - most recent: 


 Last Vital Signs











Temp  99.2 F   05/07/17 08:00


 


Pulse  68   05/07/17 08:00


 


Resp  16   05/07/17 08:00


 


BP  148/83 H  05/07/17 08:00


 


Pulse Ox  94 L  05/07/17 08:00











Weight - most recent: 189 lb


I&O - last 24 hours: 


 Intake & Output











 05/06/17 05/07/17 05/07/17





 22:59 06:59 14:59


 


Intake Total 800 300 650


 


Output Total 1500  


 


Balance -700 300 650











Lab Results last 24 hrs: 


 Laboratory Results - last 24 hr











  05/07/17 05/07/17 Range/Units





  04:45 04:45 


 


WBC  13.8 H   (4.5-11.0)  K/uL


 


RBC  4.10 L   (4.30-5.90)  M/uL


 


Hgb  13.5   (12.0-15.0)  g/dL


 


Hct  38.3 L   (40.0-54.0)  %


 


MCV  93   (80-98)  fL


 


MCH  33 H   (27-31)  pg


 


MCHC  35   (32-36)  %


 


Plt Count  366   (150-400)  K/uL


 


Neut % (Auto)  79 H   (36-66)  %


 


Lymph % (Auto)  9 L   (24-44)  %


 


Mono % (Auto)  7 H   (2-6)  %


 


Eos % (Auto)  5 H   (2-4)  %


 


Baso % (Auto)  0   (0-1)  %


 


Sodium   142  (140-148)  mmol/L


 


Potassium   3.6  (3.6-5.2)  mmol/L


 


Chloride   105  (100-108)  mmol/L


 


Carbon Dioxide   29  (21-32)  mmol/L


 


Anion Gap   8.4  (5.0-14.0)  mmol/L


 


BUN   7  (7-18)  mg/dL


 


Creatinine   1.1  (0.8-1.3)  mg/dL


 


Est Cr Clr Drug Dosing   66.36  mL/min


 


Estimated GFR (MDRD)   > 60  (>60)  


 


Glucose   94  ()  mg/dL


 


Calcium   7.9 L  (8.5-10.1)  mg/dL


 


Magnesium   2.0  (1.8-2.4)  mg/dL











Will Results last 24 hrs: 


 Microbiology











 05/05/17 16:28 Gram Stain - Final





 Sputum - Expectorated Respiratory Culture - Preliminary











Med Orders - Current: 


 Current Medications





Acetaminophen (Tylenol)  650 mg PO Q4H PRN


   PRN Reason: Pain (Mild 1-3)/fever


   Last Admin: 05/06/17 17:03 Dose:  650 mg


Albuterol (Proventil Neb Soln)  2.5 mg NEB Q4H PRN


   PRN Reason: Shortness Of Breath/wheezing


Albuterol/Ipratropium (Duoneb 3.0-0.5 Mg/3 Ml)  3 ml NEB QIDRT [facility]


   Last Admin: 05/07/17 07:19 Dose:  3 ml


Aspirin (Halfprin)  81 mg PO DAILY [facility]


   Last Admin: 05/07/17 08:18 Dose:  81 mg


Docusate Sodium (Colace)  100 mg PO BID PRN


   PRN Reason: Constipation


Enoxaparin Sodium (Lovenox)  40 mg SUBCUT Q24H [facility]


   Last Admin: 05/06/17 16:48 Dose:  40 mg


Guaifenesin/Codeine Phosphate (Robitussin Ac)  10 ml PO Q4H PRN


   PRN Reason: Cough


Piperacillin/Tazobactam/ (Dextrose 3.375 gm/ Premix)  50 mls @ 100 mls/hr IV 

Q6H [facility]


   Last Admin: 05/07/17 05:30 Dose:  100 mls/hr


Vancomycin HCl 1.3 gm/ Sodium (Chloride)  250 mls @ 167 mls/hr IV Q12H [facility]


   Last Admin: 05/07/17 06:06 Dose:  167 mls/hr


Ibuprofen (Motrin)  600 mg PO Q6H PRN


   PRN Reason: Pain/Fever


   Last Admin: 05/06/17 17:58 Dose:  600 mg


Magnesium Hydroxide (Milk Of Magnesia)  30 ml PO Q12H PRN


   PRN Reason: Constipation


Magnesium Oxide (Magnesium Oxide)  400 mg PO BID [facility]


   Last Admin: 05/07/17 08:18 Dose:  400 mg


Melatonin (Melatonin)  9 mg PO BEDTIME [facility]


   Last Admin: 05/06/17 20:30 Dose:  9 mg


Ondansetron HCl (Zofran)  4 mg IV Q4H PRN


   PRN Reason: Nausea/Vomiting


Oxycodone HCl (Oxycodone)  5 mg PO Q4H PRN


   PRN Reason: Pain (moderate 4-6)


Pantoprazole Sodium (Protonix***)  40 mg PO BIDAC [facility]


   Last Admin: 05/07/17 08:18 Dose:  40 mg


Polyethylene Glycol (Miralax)  17 gm PO DAILY PRN


   PRN Reason: Constipation


Sodium Chloride (Saline Flush)  10 ml FLUSH ASDIRECTED PRN


   PRN Reason: Keep Vein Open


Sucralfate (Carafate)  1 gm PO QIDACANDBED [facility]


   Last Admin: 05/07/17 07:17 Dose:  1 gm





Discontinued Medications





Albuterol/Ipratropium (Duoneb 3.0-0.5 Mg/3 Ml)  3 ml NEB ONETIME ONE


   Stop: 05/05/17 16:31


   Last Admin: 05/05/17 17:58 Dose:  3 ml


Sodium Chloride (Normal Saline)  100 mls @ 4 mls/sec IV ASDIRECTED [facility]


   Stop: 05/05/17 16:30


   Last Admin: 05/05/17 15:34 Dose:  4 mls/sec


Sodium Chloride (Normal Saline)  1,000 mls @ 125 mls/hr IV ASDIRECTED [facility]


   Last Admin: 05/06/17 02:52 Dose:  125 mls/hr


Vancomycin HCl 1.7 gm/ Sodium (Chloride)  250 mls @ 125 mls/hr IV ONETIME ONE


   Stop: 05/05/17 19:59


   Last Admin: 05/05/17 18:15 Dose:  125 mls/hr


Magnesium Sulfate 2 gm/ Premix  50 mls @ 25 mls/hr IV ONETIME ONE


   Stop: 05/06/17 11:59


Magnesium Sulfate 2 gm/ Premix  50 mls @ 25 mls/hr IV ONETIME ONE


   Stop: 05/06/17 10:59


   Last Admin: 05/06/17 08:57 Dose:  25 mls/hr


Iopamidol (Isovue-370 (76%))  100 ml IV .AS DIRECTED [facility]


   Stop: 05/05/17 16:30


   Last Admin: 05/05/17 15:34 Dose:  100 ml


Vancomycin HCl (Vancomycin)  1 gm IV .PHARMACY TO DOSE [facility]


   Stop: 05/05/17 18:00











- Exam


Quality Assessment: supplemental oxygen, DVT prophylaxis


General: alert, oriented, cooperative, mild distress


Lungs: Clear to auscultation, Normal respiratory effort, Rales.  No: Rhonchi, 

Rub, Stridor, Wheezing


Cardiovascular: Regular Rate, Regular Rhythm, No Murmurs


Abdomen: bowel sounds present, soft, no tenderness, no distension


Extremities: no edema


Skin: warm, dry, intact





- Problem List Review


Problem List Initiated/Reviewed/Updated: Yes





- My Orders


Last 24 Hours: 


My Active Orders





05/06/17 09:00


Magnesium Oxide   400 mg PO BID 





05/06/17 10:02


Codeine/guaiFENesin [Robitussin AC]   10 ml PO Q4H PRN 


Convert IV to Saline Lock [OM.PC] Routine 





05/08/17 05:00


BASIC METABOLIC PANEL,BMP [CHEM] Timed 


CBC WITH AUTO DIFF [HEME] Timed 


MAGNESIUM [CHEM] Timed 





05/08/17 05:30


VANCOMYCIN TROUGH [CHEM] Timed 














- Plan


Plan:: 





ASSESSMENT AND PLAN





[facility] PNEUMONIA-recent hospitalization for 

pneumonia.  Are there improvement over the past 24 hours with less shortness of 

breath and cough.  White blood cell count has come down further but is not yet 

within normal range, only low-grade temperature elevations in the past 24 

hours.  Sputum culture is showing some growth, ID and sensitivities pending


-Blood cultures pending


-Sputum cultures ordered


-Broad-spectrum IV antibiotics, vancomycin, Zosyn, levofloxacin, pending 

culture results


-Saline lock IV





HYPOXIA-secondary to current pneumonia, improved from admission.


-Supplemental oxygen as needed


-Nebulized albuterol and duo nebs as needed





ESOPHAGEAL REFLUX


-Protonix 40 mg by mouth twice a day


-Carafate 1 g by mouth 4 times a day





MAINTENANCE ISSUES


-DVT prophylaxis; Lovenox 40 mg subcutaneous daily


-GI prophylaxis; Protonix as above


-Blanchard catheter; not indicated


-Nutrition; regular diet


-Nicotine dependence; not required





CODE STATUS-FULL CODE





ADMISSION STATUS-patient will be admitted to inpatient status, expect at least 

a 2 night hospital stay for evaluation and management of problems as outlined 

above.  At the time of this admission I do not reasonably expected evaluation 

and management of this problem will require more than a 96 hour hospital stay.





DISPOSITION-anticipate discharge to home after the hospital stay.





PRIMARY CARE PROVIDER-Dr. Ledbetter

## 2017-05-08 RX ADMIN — TAZOBACTAM SODIUM AND PIPERACILLIN SODIUM SCH MLS/HR: 375; 3 INJECTION, SOLUTION INTRAVENOUS at 16:04

## 2017-05-08 RX ADMIN — Medication SCH CAP: at 08:07

## 2017-05-08 RX ADMIN — TAZOBACTAM SODIUM AND PIPERACILLIN SODIUM SCH MLS/HR: 375; 3 INJECTION, SOLUTION INTRAVENOUS at 22:03

## 2017-05-08 RX ADMIN — SUCRALFATE SCH GM: 1 SUSPENSION ORAL at 08:54

## 2017-05-08 RX ADMIN — SUCRALFATE SCH GM: 1 SUSPENSION ORAL at 11:39

## 2017-05-08 RX ADMIN — Medication SCH CAP: at 20:20

## 2017-05-08 RX ADMIN — SODIUM CHLORIDE SCH MLS/HR: 9 INJECTION, SOLUTION INTRAVENOUS at 06:24

## 2017-05-08 RX ADMIN — SUCRALFATE SCH GM: 1 SUSPENSION ORAL at 20:20

## 2017-05-08 RX ADMIN — SUCRALFATE SCH GM: 1 SUSPENSION ORAL at 16:02

## 2017-05-08 RX ADMIN — TAZOBACTAM SODIUM AND PIPERACILLIN SODIUM SCH MLS/HR: 375; 3 INJECTION, SOLUTION INTRAVENOUS at 00:01

## 2017-05-08 RX ADMIN — TAZOBACTAM SODIUM AND PIPERACILLIN SODIUM SCH MLS/HR: 375; 3 INJECTION, SOLUTION INTRAVENOUS at 11:39

## 2017-05-08 RX ADMIN — TAZOBACTAM SODIUM AND PIPERACILLIN SODIUM SCH MLS/HR: 375; 3 INJECTION, SOLUTION INTRAVENOUS at 05:22

## 2017-05-08 NOTE — PCM.PN
- General Info


Date of Service: 05/08/17


Functional Status: Reports: pain controlled, tolerating diet, ambulating





- Review of Systems


General: Reports: Fever


Pulmonary: Reports: shortness of breath, cough


Gastrointestinal: Denies: Diarrhea


Systems Review Comment:: 





No acute events overnight.  Clinically he is feeling better with less cough and 

shortness of breath.  He did have a fever to 101 last night.  No complaints of 

chest pain.  Appetite is normal.  No abdominal pain or diarrhea today.  

Cultures have all been negative.  Tolerating current antibiotics.





- Patient Data


Vitals - most recent: 


 Last Vital Signs











Temp  37.4 C   05/08/17 11:16


 


Pulse  63   05/08/17 11:16


 


Resp  20   05/08/17 11:16


 


BP  168/87 H  05/08/17 11:16


 


Pulse Ox  91 L  05/08/17 11:16











Weight - most recent: 84.368 kg


I&O - last 24 hours: 


 Intake & Output











 05/07/17 05/08/17 05/08/17





 22:59 06:59 14:59


 


Intake Total 500 950 


 


Output Total 800 750 


 


Balance -300 200 











Lab Results last 24 hrs: 


 Laboratory Results - last 24 hr











  05/08/17 05/08/17 05/08/17 Range/Units





  05:44 05:44 05:44 


 


WBC   13.4 H   (4.5-11.0)  K/uL


 


RBC   4.12 L   (4.30-5.90)  M/uL


 


Hgb   13.6   (12.0-15.0)  g/dL


 


Hct   38.4 L   (40.0-54.0)  %


 


MCV   93   (80-98)  fL


 


MCH   33 H   (27-31)  pg


 


MCHC   35   (32-36)  %


 


Plt Count   418 H   (150-400)  K/uL


 


Neut % (Auto)   76 H   (36-66)  %


 


Lymph % (Auto)   13 L   (24-44)  %


 


Mono % (Auto)   7 H   (2-6)  %


 


Eos % (Auto)   4   (2-4)  %


 


Baso % (Auto)   1   (0-1)  %


 


Sodium    140  (140-148)  mmol/L


 


Potassium    3.6  (3.6-5.2)  mmol/L


 


Chloride    104  (100-108)  mmol/L


 


Carbon Dioxide    26  (21-32)  mmol/L


 


Anion Gap    9.6  (5.0-14.0)  mmol/L


 


BUN    5 L  (7-18)  mg/dL


 


Creatinine    1.1  (0.8-1.3)  mg/dL


 


Est Cr Clr Drug Dosing    66.36  mL/min


 


Estimated GFR (MDRD)    > 60  (>60)  


 


Glucose    93  ()  mg/dL


 


Calcium    8.0 L  (8.5-10.1)  mg/dL


 


Magnesium    1.8  (1.8-2.4)  mg/dL


 


Vancomycin Trough  10.6    (10.0-20.0)  ug/mL











Will Results last 24 hrs: 


 Microbiology











 05/05/17 16:28 Gram Stain - Final





 Sputum - Expectorated Respiratory Culture - Final





    NORMAL RESPIRATORY SAMANTHA 2 DAYS


 


 05/07/17 12:21 Clostridium difficile (PCR) - Final





 Stool / Feces    NEGATIVE CDIFF TOXIN











Med Orders - Current: 


 Current Medications





Acetaminophen (Tylenol)  650 mg PO Q4H PRN


   PRN Reason: Pain (Mild 1-3)/fever


   Last Admin: 05/06/17 17:03 Dose:  650 mg


Albuterol (Proventil Neb Soln)  2.5 mg NEB Q4H PRN


   PRN Reason: Shortness Of Breath/wheezing


Albuterol/Ipratropium (Duoneb 3.0-0.5 Mg/3 Ml)  3 ml NEB QIDRT [facility]


   Last Admin: 05/08/17 10:44 Dose:  3 ml


Aspirin (Halfprin)  81 mg PO DAILY [facility]


   Last Admin: 05/08/17 08:07 Dose:  81 mg


Docusate Sodium (Colace)  100 mg PO BID PRN


   PRN Reason: Constipation


Enoxaparin Sodium (Lovenox)  40 mg SUBCUT Q24H [facility]


   Last Admin: 05/07/17 17:06 Dose:  40 mg


Guaifenesin/Codeine Phosphate (Robitussin Ac)  10 ml PO Q4H PRN


   PRN Reason: Cough


Piperacillin/Tazobactam/ (Dextrose 3.375 gm/ Premix)  50 mls @ 100 mls/hr IV 

Q6H [facility]


   Last Admin: 05/08/17 11:39 Dose:  100 mls/hr


Ibuprofen (Motrin)  600 mg PO Q6H PRN


   PRN Reason: Pain/Fever


   Last Admin: 05/06/17 17:58 Dose:  600 mg


Lactobacillus Rhamnosus (Culturelle)  2 cap PO BID [facility]


   Last Admin: 05/08/17 08:07 Dose:  2 cap


Magnesium Hydroxide (Milk Of Magnesia)  30 ml PO Q12H PRN


   PRN Reason: Constipation


Magnesium Oxide (Magnesium Oxide)  400 mg PO BID [facility]


   Last Admin: 05/08/17 08:07 Dose:  400 mg


Melatonin (Melatonin)  9 mg PO BEDTIME [facility]


   Last Admin: 05/07/17 20:04 Dose:  9 mg


Ondansetron HCl (Zofran)  4 mg IV Q4H PRN


   PRN Reason: Nausea/Vomiting


Oxycodone HCl (Oxycodone)  5 mg PO Q4H PRN


   PRN Reason: Pain (moderate 4-6)


Pantoprazole Sodium (Protonix***)  40 mg PO BIDAC [facility]


   Last Admin: 05/08/17 08:54 Dose:  40 mg


Polyethylene Glycol (Miralax)  17 gm PO DAILY PRN


   PRN Reason: Constipation


Sodium Chloride (Saline Flush)  10 ml FLUSH ASDIRECTED PRN


   PRN Reason: Keep Vein Open


Sucralfate (Carafate)  1 gm PO QIDACANDBED [facility]


   Last Admin: 05/08/17 11:39 Dose:  1 gm





Discontinued Medications





Albuterol/Ipratropium (Duoneb 3.0-0.5 Mg/3 Ml)  3 ml NEB ONETIME ONE


   Stop: 05/05/17 16:31


   Last Admin: 05/05/17 17:58 Dose:  3 ml


Sodium Chloride (Normal Saline)  [facility]


   Stop: 05/05/17 16:30


   Last Admin: 05/05/17 15:34 Dose:  4 mls/sec


Sodium Chloride (Normal Saline)  1,000 mls @ 125 mls/hr IV ASDIRECTED [facility]


   Last Admin: 05/06/17 02:52 Dose:  125 mls/hr


Vancomycin HCl 1.7 gm/ Sodium (Chloride)  250 mls @ 125 mls/hr IV ONETIME ONE


   Stop: 05/05/17 19:59


   Last Admin: 05/05/17 18:15 Dose:  125 mls/hr


Vancomycin HCl 1.3 gm/ Sodium (Chloride)  250 mls @ 167 mls/hr IV Q12H [facility]


   Last Admin: 05/08/17 06:24 Dose:  167 mls/hr


Magnesium Sulfate 2 gm/ Premix  50 mls @ 25 mls/hr IV ONETIME ONE


   Stop: 05/06/17 11:59


Magnesium Sulfate 2 gm/ Premix  50 mls @ 25 mls/hr IV ONETIME ONE


   Stop: 05/06/17 10:59


   Last Admin: 05/06/17 08:57 Dose:  25 mls/hr


Iopamidol (Isovue-370 (76%))  100 ml IV .AS DIRECTED [facility]


   Stop: 05/05/17 16:30


   Last Admin: 05/05/17 15:34 Dose:  100 ml


Vancomycin HCl (Vancomycin)  1 gm IV .PHARMACY TO DOSE [facility]


   Stop: 05/05/17 18:00











- Exam


Quality Assessment: No: supplemental oxygen


General: alert, oriented, cooperative, no acute distress


Neck: supple


Lungs: Rales (both bases).  No: Wheezing


Cardiovascular: Regular Rate, Regular Rhythm


Abdomen: soft, no distension


Extremities: no edema, no cyanosis, other (mild redness and warmth medial right 

great toe)


Skin: warm, dry


Psy/Mental Status: alert, normal affect





- Problem List Review


Problem List Initiated/Reviewed/Updated: Yes





- My Orders


Last 24 Hours: 


My Active Orders





05/09/17 05:00


CBC W/O DIFF,HEMOGRAM [HEME] Timed (1) 














- Plan


Plan:: 





ASSESSMENT AND PLAN





[facility] PNEUMONIA - recent hospitalization for 

pneumonia.  Clinically improving, white blood cell count stable but not yet 

normal.  Cultures are negative so far.


-Followup cultures


-Discontinue vancomycin with no MRSA


-Continue levofloxacin and Pip/Tazo this plan to discharge with levofloxacin, 


-Saline lock IV





HYPOXIA -s econdary to current pneumonia, now resolved


-Supplemental oxygen as needed


-Nebulized albuterol and duo nebs as needed





ESOPHAGEAL REFLUX


-Protonix 40 mg by mouth twice a day


-Carafate 1 g by mouth 4 times a day





MAINTENANCE ISSUES


-DVT prophylaxis; Lovenox 40 mg subcutaneous daily


-GI prophylaxis; Protonix as above


-Blanchard catheter; not indicated


-Nutrition; regular diet





DISPOSITION-anticipate discharge to home after the hospital stay, probably in 

the next day or 2 





 Carl Deras M.D.

## 2017-05-09 VITALS — SYSTOLIC BLOOD PRESSURE: 151 MMHG | DIASTOLIC BLOOD PRESSURE: 90 MMHG

## 2017-05-09 RX ADMIN — Medication SCH CAP: at 08:16

## 2017-05-09 RX ADMIN — SUCRALFATE SCH GM: 1 SUSPENSION ORAL at 11:10

## 2017-05-09 RX ADMIN — TAZOBACTAM SODIUM AND PIPERACILLIN SODIUM SCH MLS/HR: 375; 3 INJECTION, SOLUTION INTRAVENOUS at 04:05

## 2017-05-09 RX ADMIN — SUCRALFATE SCH GM: 1 SUSPENSION ORAL at 08:16

## 2017-05-09 RX ADMIN — TAZOBACTAM SODIUM AND PIPERACILLIN SODIUM SCH: 375; 3 INJECTION, SOLUTION INTRAVENOUS at 11:10

## 2017-05-09 NOTE — PCM.DCSUM1
**Discharge Summary





- Hospital Course


Brief History: 59-year-old male with history of esophageal reflux disease and 

recent admission for left lung pneumonia and large gastric ulceration who 

presented with high fever, worsening shortness of breath and cough today of 

discharge.  He was admitted for management of bilateral, possibly healthcare 

acquired pneumonia.





- Discharge Data


Discharge Date: 05/09/17


Discharge Disposition: Home, Self-Care 01


Condition: Good





- Discharge Diagnosis/Problem(s)


(1) Pneumonia


SNOMED Code(s): 047748695


   ICD Code: J18.9 - PNEUMONIA, UNSPECIFIED ORGANISM   Status: Acute   


Qualifiers: 


   Pneumonia type: due to unspecified organism   Laterality: bilateral   Lung 

location: upper lobe of lung   Qualified Code(s): J18.9 - Pneumonia, 

unspecified organism   





(2) GERD (gastroesophageal reflux disease)


SNOMED Code(s): 722250793


   ICD Code: K21.9 - GASTRO-ESOPHAGEAL REFLUX DISEASE WITHOUT ESOPHAGITIS   

Status: Chronic   


Qualifiers: 


   Esophagitis presence: without esophagitis   Qualified Code(s): K21.9 - Gastro

-esophageal reflux disease without esophagitis   





- Patient Summary/Data


Hospital Course: 





Willis presented to the emergency room only a matter of hours after the 

hospital discharge.  Symptoms that prompted a return visit included a very high 

fever as well as increasing cough and shortness of breath.  Workup in the 

emergency room was suggestive of a progression of his pneumonia including 

bilateral infiltrates noted on CT scan.  He was admitted to the hospital and 

started on route spectrum antibiotics.  White blood cell count was very 

elevated at more than 30,000 when he presented to the hospital.  Repeat 

cultures were obtained at the time of presentation.  Over the next couple of 

days he showed a fairly impressive clinical improvement with decreasing 

supplemental oxygen requirement and lessening of his symptoms.  His white blood 

cell count trended down and his fever curve did slowly improve over the course 

of 3 days.  Clinically he showed significant improvement with decreasing 

shortness of breath and improving functional status.  His appetite is improved.

  Physical examination has improved each day.  He is no longer dependent on 

supplemental oxygen.  We have been able to de-escalate antibiotics.  Cultures 

have all been negative.  He did have diarrhea during the early part of the 

hospital stay but his C. difficile was negative.  Diarrhea has resolved with 

the use of probiotics.  At this point I believe he is safe for hospital 

discharge and outpatient management.  The plan is for him to continue 

levofloxacin at home for 4 additional days.  He does not think that he will 

need cough suppressants.  He will be continuing his probiotics.  He has not 

been hypoxic and does not require home oxygen.





Also encountered during hospital stay with some redness, Swelling and warmth of 

his right great toe.  Gout was suspected and he was started on prednisone.  He 

had significant improvement within 24 hours.  He will need one more dose of 

prednisone after hospital discharge.





- Patient Instructions


Diet: Regular Diet as Tolerated


Activity: As Tolerated


Driving: [address] Today


Showering/Bathing: May Shower


Notify Provider of: Fever, Increased Pain, Nausea and/or Vomiting


Other/Special Instructions: 1. You were in the hospital for management of a 

bilateral pneumonia.  We did not determine the cause of bacteria with our 

cultures.  You have improved with the antibiotics that we administered during 

the hospital stay. I would recommend for additional days of antibiotic therapy 

with levofloxacin.  You will take this once daily at suppertime.  If you have 

difficulty with cough you and can use over-the-counter medications such as 

Robitussin or Delsym. I would recommend that you take probiotics twice daily 

for at least a week after you have completed your antibiotic therapy.  2. For 

your esophageal reflux disease I would recommend taking your proton pump 

inhibitor ( omeprazole or Prilosec ) twice daily for the next month and then 

once daily thereafter.  If you are symptom-free after 2-3 months you could 

consider decreasing to every other day or every third day.  I would recommend 

that you take the Carafate 3-4 times daily for the next 2 weeks. You may 

discontinue the medication at that point.  3. You had a flare of gout in your 

right great toe.  You have had 2 doses of prednisone and will need one more 

dose tomorrow morning.  4. Please seek medical attention if you develop fever 

greater than 101, worsening cough or shortness of breath or you develop severe 

chest pain.





- Discharge Plan


Prescriptions/Med Rec: 


Levofloxacin 750 mg PO QPM #4 tablet


Prednisone [IJD: predniSONE] 20 mg PO WITHBREAKFAST #1 tablet


Home Medications: 


 Home Meds





Aspirin [Low Dose Aspirin EC] 81 mg PO DAILY 09/02/14 [History]


Pantoprazole [ProTONIX***] 40 mg PO BIDAC #30 tab.cr 05/04/17 [Rx]


Sucralfate [Carafate] 1 gm PO QIDACANDBED #120 cup 05/04/17 [Rx]


Levofloxacin 750 mg PO QPM #4 tablet 05/09/17 [Rx]


Prednisone [IJD: predniSONE] 20 mg PO WITHBREAKFAST #1 tablet 05/09/17 [Rx]








Patient Handouts:  Levofloxacin tablets, Gastroesophageal Reflux Disease, Adult


Forms:  ED Department Discharge


Referrals: 


Mike Ledbetter MD [Primary Care Provider] -  (in followup next week if 

symptoms do not continued to improve or they get worse)





- Discharge Summary/Plan Comment


DC Time >30 min.: No (25)





- Patient Data


Vitals - Most Recent: 


 Last Vital Signs











Temp  36.4 C   05/09/17 07:00


 


Pulse  64   05/09/17 07:22


 


Resp  18   05/09/17 07:00


 


BP  146/93 H  05/09/17 07:00


 


Pulse Ox  92 L  05/09/17 07:00











Weight - Most Recent: 84.368 kg


I&O - Last 24 hours: 


 Intake & Output











 05/08/17 05/09/17 05/09/17





 22:59 06:59 14:59


 


Intake Total 50 620 360


 


Balance 50 620 360











Lab Results - Last 24 hrs: 


 Laboratory Results - last 24 hr











  05/09/17 Range/Units





  05:00 


 


WBC  11.3 H  (4.5-11.0)  K/uL


 


RBC  4.08 L  (4.30-5.90)  M/uL


 


Hgb  13.2  (12.0-15.0)  g/dL


 


Hct  38.4 L  (40.0-54.0)  %


 


MCV  94  (80-98)  fL


 


MCH  32 H  (27-31)  pg


 


MCHC  34  (32-36)  %


 


Plt Count  442 H  (150-400)  K/uL











CLARISSA Results - Last 24 hrs: 


 Microbiology











 05/05/17 16:28 Gram Stain - Final





 Sputum - Expectorated Respiratory Culture - Final





    NORMAL RESPIRATORY SAMANTHA 2 DAYS











Med Orders - Current: 


 Current Medications





Acetaminophen (Tylenol)  650 mg PO Q4H PRN


   PRN Reason: Pain (Mild 1-3)/fever


   Last Admin: 05/06/17 17:03 Dose:  650 mg


Albuterol (Proventil Neb Soln)  2.5 mg NEB Q4H PRN


   PRN Reason: Shortness Of Breath/wheezing


Albuterol/Ipratropium (Duoneb 3.0-0.5 Mg/3 Ml)  3 ml NEB QIDRT [facility]


   Last Admin: 05/09/17 07:21 Dose:  3 ml


Aspirin (Halfprin)  81 mg PO DAILY [facility]


   Last Admin: 05/09/17 08:16 Dose:  81 mg


Benzonatate (Tessalon Perles)  200 mg PO TID PRN


   PRN Reason: Cough


   Last Admin: 05/08/17 22:03 Dose:  200 mg


Docusate Sodium (Colace)  100 mg PO BID PRN


   PRN Reason: Constipation


Enoxaparin Sodium (Lovenox)  40 mg SUBCUT Q24H [facility]


   Last Admin: 05/08/17 16:02 Dose:  40 mg


Guaifenesin/Codeine Phosphate (Robitussin Ac)  10 ml PO Q4H PRN


   PRN Reason: Cough


   Last Admin: 05/08/17 16:21 Dose:  10 ml


Piperacillin/Tazobactam/ (Dextrose 3.375 gm/ Premix)  50 mls @ 100 mls/hr IV 

Q6H [facility]


   Last Admin: 05/09/17 04:05 Dose:  100 mls/hr


Ibuprofen (Motrin)  600 mg PO Q6H PRN


   PRN Reason: Pain/Fever


   Last Admin: 05/08/17 23:00 Dose:  600 mg


Lactobacillus Rhamnosus (Culturelle)  2 cap PO BID [facility]


   Last Admin: 05/09/17 08:16 Dose:  2 cap


Magnesium Hydroxide (Milk Of Magnesia)  30 ml PO Q12H PRN


   PRN Reason: Constipation


Magnesium Oxide (Magnesium Oxide)  400 mg PO BID [facility]


   Last Admin: 05/09/17 08:16 Dose:  400 mg


Melatonin (Melatonin)  9 mg PO BEDTIME [facility]


   Last Admin: 05/08/17 20:21 Dose:  9 mg


Ondansetron HCl (Zofran)  4 mg IV Q4H PRN


   PRN Reason: Nausea/Vomiting


Oxycodone HCl (Oxycodone)  5 mg PO Q4H PRN


   PRN Reason: Pain (moderate 4-6)


Pantoprazole Sodium (Protonix***)  40 mg PO BIDAC [facility]


   Last Admin: 05/09/17 08:16 Dose:  40 mg


Polyethylene Glycol (Miralax)  17 gm PO DAILY PRN


   PRN Reason: Constipation


Prednisone (Prednisone)  20 mg PO WITHBREAKFAST [facility]


   Stop: 05/10/17 08:01


   Last Admin: 05/09/17 08:16 Dose:  20 mg


Sodium Chloride (Saline Flush)  10 ml FLUSH ASDIRECTED PRN


   PRN Reason: Keep Vein Open


Sucralfate (Carafate)  1 gm PO QIDACANDBED [facility]


   Last Admin: 05/09/17 08:16 Dose:  1 gm





Discontinued Medications





Albuterol/Ipratropium (Duoneb 3.0-0.5 Mg/3 Ml)  3 ml NEB ONETIME ONE


   Stop: 05/05/17 16:31


   Last Admin: 05/05/17 17:58 Dose:  3 ml


Sodium Chloride (Normal Saline)  100 mls @ 4 mls/sec IV ASDIRECTED [facility]


   Stop: 05/05/17 16:30


   Last Admin: 05/05/17 15:34 Dose:  4 mls/sec


Sodium Chloride (Normal Saline)  1,000 mls @ 125 mls/hr IV ASDIRECTED [facility]


   Last Admin: 05/06/17 02:52 Dose:  125 mls/hr


Vancomycin HCl 1.7 gm/ Sodium (Chloride)  250 mls @ 125 mls/hr IV ONETIME ONE


   Stop: 05/05/17 19:59


   Last Admin: 05/05/17 18:15 Dose:  125 mls/hr


Vancomycin HCl 1.3 gm/ Sodium (Chloride)  250 mls @ 167 mls/hr IV Q12H [facility]


   Last Admin: 05/08/17 06:24 Dose:  167 mls/hr


Magnesium Sulfate 2 gm/ Premix  50 mls @ 25 mls/hr IV ONETIME ONE


   Stop: 05/06/17 11:59


Magnesium Sulfate 2 gm/ Premix  50 mls @ 25 mls/hr IV ONETIME ONE


   Stop: 05/06/17 10:59


   Last Admin: 05/06/17 08:57 Dose:  25 mls/hr


Iopamidol (Isovue-370 (76%))  100 ml IV .AS DIRECTED [facility]


   Stop: 05/05/17 16:30


   Last Admin: 05/05/17 15:34 Dose:  100 ml


Prednisone (Prednisone)  20 mg PO ONETIME ONE


   Stop: 05/08/17 13:01


   Last Admin: 05/08/17 13:59 Dose:  20 mg


Vancomycin HCl (Vancomycin)  1 gm IV .PHARMACY TO DOSE [facility]


   Stop: 05/05/17 18:00











*Q Meaningful Use (DIS)





- VTE *Q


VTE Criteria *Q: 








- Stroke *Q


Stroke Criteria *Q: 








- AMI *Q


AMI Criteria *Q:

## 2018-05-29 ENCOUNTER — HOSPITAL ENCOUNTER (EMERGENCY)
Dept: HOSPITAL 11 - JP.ED | Age: 68
Discharge: HOME | End: 2018-05-29
Payer: MEDICARE

## 2018-05-29 VITALS — DIASTOLIC BLOOD PRESSURE: 83 MMHG | SYSTOLIC BLOOD PRESSURE: 144 MMHG

## 2018-05-29 DIAGNOSIS — E86.0: ICD-10-CM

## 2018-05-29 DIAGNOSIS — T67.3XXA: Primary | ICD-10-CM

## 2018-05-29 PROCEDURE — 93005 ELECTROCARDIOGRAM TRACING: CPT

## 2018-05-29 PROCEDURE — 81001 URINALYSIS AUTO W/SCOPE: CPT

## 2018-05-29 PROCEDURE — 85025 COMPLETE CBC W/AUTO DIFF WBC: CPT

## 2018-05-29 PROCEDURE — 84484 ASSAY OF TROPONIN QUANT: CPT

## 2018-05-29 PROCEDURE — 36415 COLL VENOUS BLD VENIPUNCTURE: CPT

## 2018-05-29 PROCEDURE — 96374 THER/PROPH/DIAG INJ IV PUSH: CPT

## 2018-05-29 PROCEDURE — 96361 HYDRATE IV INFUSION ADD-ON: CPT

## 2018-05-29 PROCEDURE — 99284 EMERGENCY DEPT VISIT MOD MDM: CPT

## 2018-05-29 PROCEDURE — 80053 COMPREHEN METABOLIC PANEL: CPT

## 2018-05-29 NOTE — EDM.PDOC
ED HPI GENERAL MEDICAL PROBLEM





- General


Chief Complaint: General


Stated Complaint: DIZZINESS  NAUSA


Time Seen by Provider: 05/29/18 08:55


Source of Information: Reports: Patient


History Limitations: Reports: No Limitations





- History of Present Illness


INITIAL COMMENTS - FREE TEXT/NARRATIVE: 





pt arrived with  a history of getting up this am and feeling like he was going 

to pass out.  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 


Onset: Today, Sudden, Other (He definitely felt like he was gouing to pass out. 

)


Duration: Hour(s):, Other (Pt had no pain. )


Location: Reports: Head, Other (Pt nearly passed out. )


Associated Symptoms: Reports: Weakness, Other (near syncope)





- Related Data


 Allergies











Allergy/AdvReac Type Severity Reaction Status Date / Time


 


No Known Allergies Allergy   Verified 05/05/17 13:07











Home Meds: 


 Home Meds





NK [No Known Home Meds]  05/29/18 [History]











Past Medical History


HEENT History: Reports: Cataract, Impaired Vision, Otitis Media


Cardiovascular History: Reports: High Cholesterol, Hypertension, Other (See 

Below)


Other Cardiovascular History: "VA is watching left carotid artery"


Respiratory History: Reports: Pneumonia, Recurrent


Other Respiratory History: pneumonia x2


Gastrointestinal History: Reports: Diverticulosis, GERD


Musculoskeletal History: Reports: Other (See Below)


Other Musculoskeletal History: ankle





- Infectious Disease History


Infectious Disease History: Reports: Chicken Pox, Measles, Mumps





- Past Surgical History


HEENT Surgical History: Reports: Cataract Surgery, Oral Surgery, Other (See 

Below)


GI Surgical History: Reports: Cholecystectomy, Colonoscopy


Musculoskeletal Surgical History: Reports: Arthroscopic Knee, Other (See Below)





Social & Family History





- Family History


Family Medical History: Noncontributory


GI: Reports: Cirrhosis, GERD





- Tobacco Use


Smoking Status *Q: Never Smoker





- Caffeine Use


Caffeine Use: Reports: None





- Recreational Drug Use


Recreational Drug Use: No





ED ROS GENERAL





- Review of Systems


Review Of Systems: See Below


Constitutional: Reports: Chills, Weakness, Other (nausea)


HEENT: Reports: No Symptoms


Respiratory: Reports: No Symptoms


Cardiovascular: Reports: No Symptoms


Endocrine: Reports: No Symptoms, Fatigue


GI/Abdominal: Reports: Nausea


: Reports: No Symptoms


Musculoskeletal: Reports: No Symptoms


Skin: Reports: No Symptoms


Neurological: Reports: Dizziness


Psychiatric: Reports: No Symptoms


Hematologic/Lymphatic: Reports: No Symptoms





ED EXAM, GENERAL





- Physical Exam


Exam: See Below


Free Text/Narrative:: 





pt arrived with  an episode when he first got up where he felt like he was 

going to pass out. 


Exam Limited By: No Limitations


General Appearance: Alert, Anxious, Mild Distress, Other (pupils are equal and 

reactive. )


Ears: Normal TMs


Nose: Normal Inspection


Throat/Mouth: Normal Inspection


Head: Atraumatic


Neck: Normal Inspection


Respiratory/Chest: No Respiratory Distress


Cardiovascular: Regular Rate, Rhythm


GI/Abdominal: Soft, Non-Tender


 (Male) Exam: Deferred


Rectal (Males) Exam: Deferred


Back Exam: Normal Inspection


Extremities: Normal Inspection


Neurological: Alert, Oriented, Normal Cognition


Psychiatric: Normal Affect





Course





- Vital Signs


Last Recorded V/S: 


 Last Vital Signs











Temp  34.8 C L  05/29/18 08:34


 


Pulse  50 L  05/29/18 08:34


 


Resp  18   05/29/18 08:34


 


BP  144/83 H  05/29/18 08:34


 


Pulse Ox  100   05/29/18 08:34








 





Orthostatic Blood Pressure [     144/84


Standing]                        


Orthostatic Blood Pressure [     133/78


Sitting]                         


Orthostatic Blood Pressure [     138/81


Supine]                          











- Orders/Labs/Meds


Labs: 


 Laboratory Tests











  05/29/18 05/29/18 05/29/18 Range/Units





  08:47 08:47 08:47 


 


WBC   5.5   (4.5-11.0)  K/uL


 


RBC   5.06   (4.30-5.90)  M/uL


 


Hgb   16.8 H D   (12.0-15.0)  g/dL


 


Hct   46.1   (40.0-54.0)  %


 


MCV   91   (80-98)  fL


 


MCH   33 H   (27-31)  pg


 


MCHC   36   (32-36)  %


 


Plt Count   207   (150-400)  K/uL


 


Neut % (Auto)   56   (36-66)  %


 


Lymph % (Auto)   29   (24-44)  %


 


Mono % (Auto)   11 H   (2-6)  %


 


Eos % (Auto)   3   (2-4)  %


 


Baso % (Auto)   1   (0-1)  %


 


Sodium    138 L  (140-148)  mmol/L


 


Potassium    4.1  (3.6-5.2)  mmol/L


 


Chloride    101  (100-108)  mmol/L


 


Carbon Dioxide    27  (21-32)  mmol/L


 


Anion Gap    14.1 H  (5.0-14.0)  mmol/L


 


BUN    16  D  (7-18)  mg/dL


 


Creatinine    1.2  (0.8-1.3)  mg/dL


 


Est Cr Clr Drug Dosing    59.99  mL/min


 


Estimated GFR (MDRD)    > 60  (>60)  


 


Glucose    115 H  ()  mg/dL


 


Calcium    8.5  (8.5-10.1)  mg/dL


 


Total Bilirubin    1.3 H  (0.2-1.0)  mg/dL


 


AST    28  (15-37)  U/L


 


ALT    34  (12-78)  U/L


 


Alkaline Phosphatase    81  ()  U/L


 


Troponin I  < 0.017    (0.000-0.056)  ng/mL


 


Total Protein    7.1  (6.4-8.2)  g/dL


 


Albumin    3.6  (3.4-5.0)  g/dL


 


Globulin    3.5  (2.3-3.5)  g/dL


 


Albumin/Globulin Ratio    1.0 L  (1.2-2.2)  


 


Urine Color     


 


Urine Appearance     


 


Urine pH     (4.5-8.0)  


 


Ur Specific Gravity     (1.008-1.030)  


 


Urine Protein     (NEGATIVE)  mg/dL


 


Urine Glucose (UA)     (NEGATIVE)  mg/dL


 


Urine Ketones     (NEGATIVE)  mg/dL


 


Urine Occult Blood     (NEGATIVE)  


 


Urine Nitrite     (NEGAITVE)  


 


Urine Bilirubin     (NEGATIVE)  


 


Urine Urobilinogen     (NORMAL)  mg/dL


 


Ur Leukocyte Esterase     (NEGATIVE)  


 


Urine RBC     (0-5)  


 


Urine WBC     (0-5)  


 


Ur Epithelial Cells     


 


Amorphous Sediment     


 


Urine Bacteria     


 


Urine Mucus     














  05/29/18 Range/Units





  10:12 


 


WBC   (4.5-11.0)  K/uL


 


RBC   (4.30-5.90)  M/uL


 


Hgb   (12.0-15.0)  g/dL


 


Hct   (40.0-54.0)  %


 


MCV   (80-98)  fL


 


MCH   (27-31)  pg


 


MCHC   (32-36)  %


 


Plt Count   (150-400)  K/uL


 


Neut % (Auto)   (36-66)  %


 


Lymph % (Auto)   (24-44)  %


 


Mono % (Auto)   (2-6)  %


 


Eos % (Auto)   (2-4)  %


 


Baso % (Auto)   (0-1)  %


 


Sodium   (140-148)  mmol/L


 


Potassium   (3.6-5.2)  mmol/L


 


Chloride   (100-108)  mmol/L


 


Carbon Dioxide   (21-32)  mmol/L


 


Anion Gap   (5.0-14.0)  mmol/L


 


BUN   (7-18)  mg/dL


 


Creatinine   (0.8-1.3)  mg/dL


 


Est Cr Clr Drug Dosing   mL/min


 


Estimated GFR (MDRD)   (>60)  


 


Glucose   ()  mg/dL


 


Calcium   (8.5-10.1)  mg/dL


 


Total Bilirubin   (0.2-1.0)  mg/dL


 


AST   (15-37)  U/L


 


ALT   (12-78)  U/L


 


Alkaline Phosphatase   ()  U/L


 


Troponin I   (0.000-0.056)  ng/mL


 


Total Protein   (6.4-8.2)  g/dL


 


Albumin   (3.4-5.0)  g/dL


 


Globulin   (2.3-3.5)  g/dL


 


Albumin/Globulin Ratio   (1.2-2.2)  


 


Urine Color  Yellow  


 


Urine Appearance  Clear  


 


Urine pH  7.0  (4.5-8.0)  


 


Ur Specific Gravity  1.010  (1.008-1.030)  


 


Urine Protein  Negative  (NEGATIVE)  mg/dL


 


Urine Glucose (UA)  Normal  (NEGATIVE)  mg/dL


 


Urine Ketones  Negative  (NEGATIVE)  mg/dL


 


Urine Occult Blood  Negative  (NEGATIVE)  


 


Urine Nitrite  Negative  (NEGAITVE)  


 


Urine Bilirubin  Negative  (NEGATIVE)  


 


Urine Urobilinogen  Normal  (NORMAL)  mg/dL


 


Ur Leukocyte Esterase  Negative  (NEGATIVE)  


 


Urine RBC  Not seen  (0-5)  


 


Urine WBC  Not seen  (0-5)  


 


Ur Epithelial Cells  Not seen  


 


Amorphous Sediment  Not seen  


 


Urine Bacteria  Not seen  


 


Urine Mucus  Not seen  











Meds: 


Medications














Discontinued Medications














Generic Name Dose Route Start Last Admin





  Trade Name Freq  PRN Reason Stop Dose Admin


 


Sodium Chloride  1,000 mls @ 999 mls/hr  05/29/18 09:00  05/29/18 10:04





  Normal Saline  IV   Infused





  ASDIRECTED CITLALY   Infusion





     





     





     





     


 


Sodium Chloride  1,000 mls @ 999 mls/hr  05/29/18 09:30  05/29/18 10:14





  Normal Saline  IV   999 mls/hr





  ASDIRECTED CITLALY   Administration





     





     





     





     


 


Ondansetron HCl  4 mg  05/29/18 09:19  05/29/18 09:23





  Zofran  IVPUSH  05/29/18 09:20  4 mg





  ONETIME ONE   Administration





     





     





     





     














- Re-Assessments/Exams


Free Text/Narrative Re-Assessment/Exam: 





05/29/18 11:11


pt had a normal trop/His ekg did not show changes he was a  little slower than 

usual. 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 His other 

labs look good. His orthostatics were good. He is feeling much better





Departure





- Departure


Time of Disposition: 11:14


Disposition: Home, Self-Care 01


Condition: Fair


Clinical Impression: 


 Dehydration, Heat exhaustion due to water depletion








- Discharge Information


Instructions:  Heat Exhaustion Information, Dehydration, Adult, Easy-to-Read


Referrals: 


Mike Ledbetter MD [Primary Care Provider] - 


Forms:  ED Department Discharge


Care Plan Goals: 


push fluids,  check pulse to see what the rate is running, low activity rtc if 

further symptoms.

## 2019-06-07 ENCOUNTER — HOSPITAL ENCOUNTER (EMERGENCY)
Dept: HOSPITAL 11 - JP.ED | Age: 69
Discharge: HOME | End: 2019-06-07
Payer: MEDICARE

## 2019-06-07 VITALS — DIASTOLIC BLOOD PRESSURE: 92 MMHG | SYSTOLIC BLOOD PRESSURE: 152 MMHG

## 2019-06-07 DIAGNOSIS — E78.00: ICD-10-CM

## 2019-06-07 DIAGNOSIS — S16.1XXA: ICD-10-CM

## 2019-06-07 DIAGNOSIS — S39.011A: Primary | ICD-10-CM

## 2019-06-07 DIAGNOSIS — I10: ICD-10-CM
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
## 2019-06-07 DIAGNOSIS — Z79.899: ICD-10-CM
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
## 2019-06-07 NOTE — CRLCR
Lateral cervical spine 



INDICATION:



Trauma.



IMPRESSION:



No visualized fracture.



Alignments anatomic.



No additional osseous lesion. 



FINDINGS:



Cross-table lateral cervical spine. C7-T1 is visible. No fractures or 

listhesis. Prevertebral soft tissues are normal. Multilevel disk 

degenerative narrowing.



Dictated by Jorgito Melgoza MD @ Jun 7 2019 11:36PM



Signed by Dr. Jorgito Melgoza @ Jun 7 2019 11:38PM
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
